# Patient Record
Sex: FEMALE | Race: OTHER | NOT HISPANIC OR LATINO | URBAN - METROPOLITAN AREA
[De-identification: names, ages, dates, MRNs, and addresses within clinical notes are randomized per-mention and may not be internally consistent; named-entity substitution may affect disease eponyms.]

---

## 2019-12-01 ENCOUNTER — EMERGENCY (EMERGENCY)
Facility: HOSPITAL | Age: 44
LOS: 0 days | Discharge: HOME | End: 2019-12-01
Attending: EMERGENCY MEDICINE | Admitting: EMERGENCY MEDICINE
Payer: MEDICAID

## 2019-12-01 VITALS
DIASTOLIC BLOOD PRESSURE: 71 MMHG | HEART RATE: 76 BPM | WEIGHT: 195.11 LBS | SYSTOLIC BLOOD PRESSURE: 109 MMHG | RESPIRATION RATE: 19 BRPM | TEMPERATURE: 98 F | OXYGEN SATURATION: 98 %

## 2019-12-01 VITALS
HEART RATE: 68 BPM | SYSTOLIC BLOOD PRESSURE: 123 MMHG | RESPIRATION RATE: 18 BRPM | DIASTOLIC BLOOD PRESSURE: 74 MMHG | OXYGEN SATURATION: 99 % | TEMPERATURE: 98 F

## 2019-12-01 DIAGNOSIS — R07.89 OTHER CHEST PAIN: ICD-10-CM

## 2019-12-01 DIAGNOSIS — R11.0 NAUSEA: ICD-10-CM

## 2019-12-01 DIAGNOSIS — R07.9 CHEST PAIN, UNSPECIFIED: ICD-10-CM

## 2019-12-01 DIAGNOSIS — F41.9 ANXIETY DISORDER, UNSPECIFIED: ICD-10-CM

## 2019-12-01 LAB
ALBUMIN SERPL ELPH-MCNC: 4.8 G/DL — SIGNIFICANT CHANGE UP (ref 3.5–5.2)
ALP SERPL-CCNC: 77 U/L — SIGNIFICANT CHANGE UP (ref 30–115)
ALT FLD-CCNC: 11 U/L — SIGNIFICANT CHANGE UP (ref 0–41)
ANION GAP SERPL CALC-SCNC: 13 MMOL/L — SIGNIFICANT CHANGE UP (ref 7–14)
AST SERPL-CCNC: 15 U/L — SIGNIFICANT CHANGE UP (ref 0–41)
BASOPHILS # BLD AUTO: 0.04 K/UL — SIGNIFICANT CHANGE UP (ref 0–0.2)
BASOPHILS NFR BLD AUTO: 0.5 % — SIGNIFICANT CHANGE UP (ref 0–1)
BILIRUB SERPL-MCNC: 0.2 MG/DL — SIGNIFICANT CHANGE UP (ref 0.2–1.2)
BUN SERPL-MCNC: 11 MG/DL — SIGNIFICANT CHANGE UP (ref 10–20)
CALCIUM SERPL-MCNC: 9.1 MG/DL — SIGNIFICANT CHANGE UP (ref 8.5–10.1)
CHLORIDE SERPL-SCNC: 104 MMOL/L — SIGNIFICANT CHANGE UP (ref 98–110)
CO2 SERPL-SCNC: 22 MMOL/L — SIGNIFICANT CHANGE UP (ref 17–32)
CREAT SERPL-MCNC: 0.7 MG/DL — SIGNIFICANT CHANGE UP (ref 0.7–1.5)
EOSINOPHIL # BLD AUTO: 0.03 K/UL — SIGNIFICANT CHANGE UP (ref 0–0.7)
EOSINOPHIL NFR BLD AUTO: 0.4 % — SIGNIFICANT CHANGE UP (ref 0–8)
GLUCOSE SERPL-MCNC: 120 MG/DL — HIGH (ref 70–99)
HCG SERPL QL: NEGATIVE — SIGNIFICANT CHANGE UP
HCT VFR BLD CALC: 40.6 % — SIGNIFICANT CHANGE UP (ref 37–47)
HGB BLD-MCNC: 13.7 G/DL — SIGNIFICANT CHANGE UP (ref 12–16)
IMM GRANULOCYTES NFR BLD AUTO: 0.3 % — SIGNIFICANT CHANGE UP (ref 0.1–0.3)
LYMPHOCYTES # BLD AUTO: 1.99 K/UL — SIGNIFICANT CHANGE UP (ref 1.2–3.4)
LYMPHOCYTES # BLD AUTO: 26.3 % — SIGNIFICANT CHANGE UP (ref 20.5–51.1)
MCHC RBC-ENTMCNC: 29.5 PG — SIGNIFICANT CHANGE UP (ref 27–31)
MCHC RBC-ENTMCNC: 33.7 G/DL — SIGNIFICANT CHANGE UP (ref 32–37)
MCV RBC AUTO: 87.3 FL — SIGNIFICANT CHANGE UP (ref 81–99)
MONOCYTES # BLD AUTO: 0.32 K/UL — SIGNIFICANT CHANGE UP (ref 0.1–0.6)
MONOCYTES NFR BLD AUTO: 4.2 % — SIGNIFICANT CHANGE UP (ref 1.7–9.3)
NEUTROPHILS # BLD AUTO: 5.17 K/UL — SIGNIFICANT CHANGE UP (ref 1.4–6.5)
NEUTROPHILS NFR BLD AUTO: 68.3 % — SIGNIFICANT CHANGE UP (ref 42.2–75.2)
NRBC # BLD: 0 /100 WBCS — SIGNIFICANT CHANGE UP (ref 0–0)
PLATELET # BLD AUTO: 285 K/UL — SIGNIFICANT CHANGE UP (ref 130–400)
POTASSIUM SERPL-MCNC: 4.1 MMOL/L — SIGNIFICANT CHANGE UP (ref 3.5–5)
POTASSIUM SERPL-SCNC: 4.1 MMOL/L — SIGNIFICANT CHANGE UP (ref 3.5–5)
PROT SERPL-MCNC: 7.8 G/DL — SIGNIFICANT CHANGE UP (ref 6–8)
RBC # BLD: 4.65 M/UL — SIGNIFICANT CHANGE UP (ref 4.2–5.4)
RBC # FLD: 12.6 % — SIGNIFICANT CHANGE UP (ref 11.5–14.5)
SODIUM SERPL-SCNC: 139 MMOL/L — SIGNIFICANT CHANGE UP (ref 135–146)
TROPONIN T SERPL-MCNC: <0.01 NG/ML — SIGNIFICANT CHANGE UP
TROPONIN T SERPL-MCNC: <0.01 NG/ML — SIGNIFICANT CHANGE UP
WBC # BLD: 7.57 K/UL — SIGNIFICANT CHANGE UP (ref 4.8–10.8)
WBC # FLD AUTO: 7.57 K/UL — SIGNIFICANT CHANGE UP (ref 4.8–10.8)

## 2019-12-01 PROCEDURE — 99285 EMERGENCY DEPT VISIT HI MDM: CPT

## 2019-12-01 PROCEDURE — 71046 X-RAY EXAM CHEST 2 VIEWS: CPT | Mod: 26

## 2019-12-01 RX ORDER — IBUPROFEN 200 MG
600 TABLET ORAL ONCE
Refills: 0 | Status: COMPLETED | OUTPATIENT
Start: 2019-12-01 | End: 2019-12-01

## 2019-12-01 RX ADMIN — Medication 600 MILLIGRAM(S): at 14:31

## 2019-12-01 NOTE — ED PROVIDER NOTE - PHYSICAL EXAMINATION
PHYSICAL EXAM: I have reviewed current vital signs.  GENERAL: NAD, tearful/anxious.  HEAD:  Normocephalic, atraumatic.  EYES: Conjunctiva and sclera clear.  ENT: MMM, no erythema/exudates.  NECK: Supple, full ROM.  CHEST/LUNG: Clear to auscultation bilaterally; no wheezes, rales, or rhonchi.  HEART: Regular rate and rhythm, normal S1 and S2; no murmurs, rubs, or gallops.  ABDOMEN: Soft, nontender, nondistended.  EXTREMITIES:  2+ peripheral pulses; FROM.  PSYCH: Cooperative, appropriate, normal mood and affect.  NEUROLOGY: A&O x 3. Motor 5/5. No focal neurological deficits.   SKIN: Warm and dry. No rashes.

## 2019-12-01 NOTE — ED PROVIDER NOTE - NS ED ROS FT
Constitutional:  No fevers or chills.  Eyes:  No visual changes, eye pain, or discharge.  ENT:  No sore throat.  Neck:  No neck pain.  Cardiac:  +CP, no edema.  Resp:  No cough or SOB.  GI:  +N. No vomiting, diarrhea, or abdominal pain.  :  No dysuria, frequency, or hematuria.  MSK:  No myalgias or joint pain/swelling.  Neuro:  No headache, dizziness, or weakness.  Skin:  No skin rash.

## 2019-12-01 NOTE — ED PROVIDER NOTE - PATIENT PORTAL LINK FT
You can access the FollowMyHealth Patient Portal offered by [facility] by registering at the following website: [email or web address]. By joining [employer]’s FollowMyHealth portal, you will also be able to view your health information using other applications (apps) compatible with our system.

## 2019-12-01 NOTE — ED PROVIDER NOTE - PROGRESS NOTE DETAILS
Informed patient and family of lab/radiology results. Given cardiologist. Strict return precaution signs/sxs given. Trop negative x 2. Pain improved.

## 2019-12-01 NOTE — ED PROVIDER NOTE - CARE PROVIDER_API CALL
Renetta Lim)  Cardiology; Internal Medicine; Nuclear Cardiology  [address] [place] [address]  Phone: [phone]  Fax: [phone]  Follow Up Time:

## 2019-12-01 NOTE — ED PROVIDER NOTE - NSFOLLOWUPINSTRUCTIONS_ED_ALL_ED_FT
Please follow-up with the cardiologist as soon as possible.    Chest Pain  Chest pain can be caused by many different conditions which may or may not be dangerous. Causes include heartburn, lung infections, heart attack, blood clot in lungs, skin infections, strain or damage to muscle, cartilage, or bones, etc. In addition to a history and physical examination, an electrocardiogram (ECG) or other lab tests may have been performed to determine the cause of your chest pain. Follow up with your primary care provider or with a cardiologist as instructed.     SEEK IMMEDIATE MEDICAL CARE IF YOU HAVE ANY OF THE FOLLOWING SYMPTOMS: worsening chest pain, coughing up blood, unexplained back/neck/jaw pain, severe abdominal pain, dizziness or lightheadedness, fainting, shortness of breath, sweaty or clammy skin, vomiting, or racing heart beat. These symptoms may represent a serious problem that is an emergency. Do not wait to see if the symptoms will go away. Get medical help right away. Call 911 and do not drive yourself to the hospital.

## 2019-12-01 NOTE — ED ADULT NURSE NOTE - NS_SISCREENINGSR_GEN_ALL_ED
OT IRP Treatment      Primary Rehabilitation Diagnosis: CVA  Expected Discharge Date: 03/19/19  Planned Discharge Destination: Home    SUBJECTIVE: Subjective: pt agreeable to occupational therapy treatment session.  (02/19/19 1400)  Subjective/Objective Comments: rehab aid assists pt back to room  (02/19/19 1400)    OBJECTIVE:  Precautions  Other Precautions: fall risk, LLE incoordination (02/18/19 0702)    See below for current functional status overview.  See OT flowsheet for full details regarding the OT therapy provided.    ASSESSMENT:   Treatment today focused on functional transfers, LUE neuro re-ed, and discussion on plan of care.  Progress supported by participation in therapy session. Patient limited at this time by fatigue, left-sided weakness, and balance deficits.  Patient will benefit from further skilled OT  for continued training with ADLs and functional mobility to help the patient meet goal of increasing independence.    OT Identified Barriers to Discharge: left sided weakness, balance impairments, coordination impairments, decreased safety, ADLs, functional mobility     This patient participated in all scheduled occupational therapy time with this therapist today.    EDUCATION:   On this date, education was provided to patient regarding  plan of care  The response to education was/were: Needs reinforcement    PLAN:   Continue skilled OT, including the following Treatment Interventions: ADL retraining;Functional transfer training;UE strengthening/ROM;Endurance training;Cognitive reorientation;Patient/Family training;Equipment eval/education;Neuro muscular reeducation;Fine motor coordination activities;Compensatory technique education (02/19/19 1400)   OT Frequency: 7 days/week (02/19/19 1400), Frequency Comments: 90 min at least 5 days per week  (02/19/19 1400)    Treatment Plan for Next Session: am: seated shower, dressing and grooming focus on L sided use as stabilizer  Additional Plan  Considerations: PM: LUE strengthening, setup Bioness to LUE (order in chart already)        RECOMMENDATIONS FOR DISCHARGE:  Recommendations for Discharge: OT: Home, Home therapy    PT/OT Mobility Equipment for Discharge: Will continue to assess (02/19/19 1400)  PT/OT ADL Equipment for Discharge: to be assessed  (02/19/19 1400)      FUNCTIONAL DATA OVERVIEW LAST 24 HOURS  ADLs   Self Cares/ADL's  Self Cares/ADL's Comments #1: pt completed prior to session (02/19/19 1030)    Household mobility  Household Mobility  Sit to Stand: Minimal Assist (Min) (02/19/19 1400)  Stand to Sit: Minimal Assist (Min) (02/19/19 1400)  Stand Pivot Transfers: Minimal Assist (Min) (02/19/19 1400)  Transfer Equipment: gait belt (02/19/19 1400)  Sitting - Static: Supervision (02/19/19 1400)  Sitting - Dynamic: Supervision (02/19/19 1400)  Standing - Static: Minimal Assist (Min) (02/19/19 1400)  Standing - Dynamic: Minimal Assist (Min) (02/19/19 1400)  Household Mobility Comments #1: pt performs functional transfers at min assist level. pt requires level of assist due to balance deficits, fatigue, and left-sided weakness.  (02/19/19 1400)    Home Management       Tolerance  OT Activity Tolerance  Activity Tolerance: 1:1 Activity to rest (02/19/19 1400)  Activity Tolerance Comments: fair  (02/19/19 1400)    Cognition  Communication/Cognition  Communication: Clear speech (02/19/19 1400)  Overall Cognitive Status: Within Functional Limits (02/19/19 1400)  Arousal/Alertness: Appropriate responses to stimuli (02/19/19 1400)  Attention: Appears intact (02/19/19 1400)  Following Verbal Directions: Follows one step directions without difficulty (02/19/19 1400)  Following Demonstrated Directions: Follows one step directions without difficulty (02/19/19 1400)  Additional Functional Cognition Tests: Cognitive Performance Test (02/19/19 1030)  Additional Functional Cognition Tests Interpretation: pt scored 4.5/6 on [employer] and 5/6 on the shop subtask  (02/19/19 1030)    Interventions  Other Interventions 1: focus on LUE neuro re-ed utilizing closed chained exercise, guided functional reaching, guided functional grasp and release, and AAROM  (02/19/19 1400)  Other Interventions 2: time spent discussing plan of care, pt verbalizes understanding.  (02/19/19 1400)      Negative

## 2019-12-01 NOTE — ED PROVIDER NOTE - CLINICAL SUMMARY MEDICAL DECISION MAKING FREE TEXT BOX
44y female with left chest wall/shoulder/arm pain as above, has h/o similar symptoms in past and had negative stress test 1 year ago with her cardiologist in [facility] as she has significant FHx premature CAD, on exam vital signs appreciated, nontoxic appearing though anxious and tearful, head nc/at, perrla, EOMI, conj pink op clear neck supple cor rrr lungs cta + ttp left pectoralis exactly reproducing pain, no rash, abd snt no c/c/e pulses equal calves nontender neuro intact, labs and studies reviewed and d/w patient and family, will d/c to f/u with cardio. Patient counseled regarding conditions which should prompt return.

## 2019-12-01 NOTE — ED PROVIDER NOTE - ATTENDING CONTRIBUTION TO CARE
44y female with left chest wall/shoulder/arm pain as above, has h/o similar symptoms in past and had negative stress test 1 year ago with her cardiologist in [facility] as she has significant FHx premature CAD, on exam vital signs appreciated, nontoxic appearing though anxious and tearful, head nc/at, perrla, EOMI, conj pink op clear neck supple cor rrr lungs cta + ttp left pectoralis exactly reproducing pain, no rash, abd snt no c/c/e pulses equal calves nontender neuro intact, labs and studies reviewed and d/w patient and family, will d/c to f/u with cardio. Patient counseled regarding conditions which should prompt return.

## 2019-12-01 NOTE — ED PROVIDER NOTE - OBJECTIVE STATEMENT
43yo F with PMH of herniated disks, asthma, and anxiety/panic attacks presenting to ED with left sided CP with radiation down left arm that started about 30 minutes PTA after patient became stressed about a family matter. Describes the pain as intermittent, sharp, mild to moderate, waxing and waning, worse with deep breaths, and with associated nausea. Denies any injuries/traumas/falls, SOB, back pain, abd pain, vomiting/diarrhea, rash, or paresthesias. Patient states she took a baby ASA. Last LMP was about 1 week ago. Former smoker. +Significant family hx of heart disease in mother and brother passed away at 44 from MI.

## 2020-03-15 ENCOUNTER — EMERGENCY (EMERGENCY)
Facility: HOSPITAL | Age: 45
LOS: 0 days | Discharge: HOME | End: 2020-03-15
Attending: EMERGENCY MEDICINE | Admitting: EMERGENCY MEDICINE
Payer: MEDICAID

## 2020-03-15 VITALS
HEIGHT: 62 IN | HEART RATE: 86 BPM | WEIGHT: 190.04 LBS | TEMPERATURE: 99 F | RESPIRATION RATE: 18 BRPM | SYSTOLIC BLOOD PRESSURE: 102 MMHG | OXYGEN SATURATION: 100 % | DIASTOLIC BLOOD PRESSURE: 83 MMHG

## 2020-03-15 DIAGNOSIS — R05 COUGH: ICD-10-CM

## 2020-03-15 DIAGNOSIS — J45.909 UNSPECIFIED ASTHMA, UNCOMPLICATED: ICD-10-CM

## 2020-03-15 PROBLEM — F41.9 ANXIETY DISORDER, UNSPECIFIED: Chronic | Status: ACTIVE | Noted: 2019-12-01

## 2020-03-15 PROCEDURE — 99283 EMERGENCY DEPT VISIT LOW MDM: CPT

## 2020-03-15 RX ORDER — DEXAMETHASONE 0.5 MG/5ML
10 ELIXIR ORAL ONCE
Refills: 0 | Status: COMPLETED | OUTPATIENT
Start: 2020-03-15 | End: 2020-03-15

## 2020-03-15 RX ADMIN — Medication 10 MILLIGRAM(S): at 15:22

## 2020-03-15 NOTE — ED PROVIDER NOTE - PHYSICAL EXAMINATION
CONST: NAD  EYES: Sclera and conjunctiva clear.   ENT: Clear nasal discharge. Oropharynx normal appearing, no erythema or exudates. No abscess or swelling. Uvula midline.   NECK: Non-tender, no meningeal signs. normal ROM. supple   CARD: S1 S2; No jvd  RESP: Equal BS B/L, No wheezes, rhonchi or rales. No distress  GI: Soft, non-tender, non-distended. no cva tenderness. normal BS  MS: Normal ROM in all extremities. pulses 2 +. no calf tenderness or swelling  SKIN: Warm, dry, no acute rashes. Good turgor  NEURO: A&Ox4, No focal deficits. Strength 5/5 with no sensory deficits. Steady gait.

## 2020-03-15 NOTE — ED PROVIDER NOTE - PATIENT PORTAL LINK FT
You can access the FollowMyHealth Patient Portal offered by [facility] by registering at the following website: [email or web address]. By joining [employer]’s FollowMyHealth portal, you will also be able to view your health information using other applications (apps) compatible with our system.

## 2020-03-15 NOTE — ED PROVIDER NOTE - NS ED ROS FT
Constitutional: (-) fever  Eyes/ENT: (-) blurry vision, (-) epistaxis  Cardiovascular: (-) chest pain, (-) syncope  Respiratory: (+) cough, (-) shortness of breath  Gastrointestinal: (-) vomiting, (-) diarrhea  : (-) dysuria, (-) hematuria  Musculoskeletal: (-) neck pain, (-) back pain, (-) joint pain  Integumentary: (-) rash, (-) edema  Neurological: (-) headache, (-) altered mental status  Allergic/Immunologic: (-) pruritus

## 2020-03-15 NOTE — ED PROVIDER NOTE - ATTENDING CONTRIBUTION TO CARE
45 year old female, no sig pmhx, here with uri symptoms, dry non productive cough, no fever, no loc, no n/v/d, no criteria for covid testing met, no sick contacts, no recent travel    CONSTITUTIONAL: Well-developed; well-nourished; in no acute distress. Sitting up and providing appropriate history and physical examination  SKIN: skin exam is warm and dry, no acute rash.  HEAD: Normocephalic; atraumatic.  EYES: PERRL, 3 mm bilateral, no nystagmus, EOM intact; conjunctiva and sclera clear.  ENT: + Pharyngeal erythema, no exudate, no edema, No nasal discharge; airway clear.  NECK: Supple; non tender. + full passive ROM in all directions. No JVD  CARD: S1, S2 normal; no murmurs, gallops, or rubs. Regular rate and rhythm. + Symmetric Strong Pulses  RESP: No wheezes, rales or rhonchi. Good air movement bilaterally  ABD: soft; non-distended; non-tender. No Rebound, No Guarding, No signs of peritonitis, No CVA tenderness. No pulsatile abdominal mass. + Strong and Symmetric Pulses  EXT: Normal ROM. No clubbing, cyanosis or edema. Dp and Pt Pulses intact. Cap refill less than 3 seconds  NEURO: CN 2-12 intact, normal finger to nose, normal romberg, stable gait, no sensory or motor deficits, Alert, oriented, grossly unremarkable. No Focal deficits. GCS 15. NIH 0  PSYCH: Cooperative, appropriate.

## 2020-03-15 NOTE — ED PROVIDER NOTE - OBJECTIVE STATEMENT
45y F pmh as listed presents for eval of cough. Pt has np cough x2 days, no aggravating or relieving factors. Denies fever, ha, cp, sob, rhinorrhea, weakness, numbness, n/v/d/c

## 2021-08-13 ENCOUNTER — INPATIENT (INPATIENT)
Facility: HOSPITAL | Age: 46
LOS: 2 days | Discharge: HOME HEALTH | End: 2021-08-16
Attending: INTERNAL MEDICINE | Admitting: INTERNAL MEDICINE
Payer: MEDICAID

## 2021-08-13 VITALS — WEIGHT: 190.04 LBS | HEIGHT: 62 IN

## 2021-08-13 DIAGNOSIS — Z98.890 OTHER SPECIFIED POSTPROCEDURAL STATES: Chronic | ICD-10-CM

## 2021-08-13 DIAGNOSIS — I63.9 CEREBRAL INFARCTION, UNSPECIFIED: ICD-10-CM

## 2021-08-13 LAB
ALBUMIN SERPL ELPH-MCNC: 4.5 G/DL — SIGNIFICANT CHANGE UP (ref 3.5–5.2)
ALP SERPL-CCNC: 95 U/L — SIGNIFICANT CHANGE UP (ref 30–115)
ALT FLD-CCNC: 14 U/L — SIGNIFICANT CHANGE UP (ref 0–41)
ANION GAP SERPL CALC-SCNC: 16 MMOL/L — HIGH (ref 7–14)
APTT BLD: 33.7 SEC — SIGNIFICANT CHANGE UP (ref 27–39.2)
AST SERPL-CCNC: 18 U/L — SIGNIFICANT CHANGE UP (ref 0–41)
BASOPHILS # BLD AUTO: 0.04 K/UL — SIGNIFICANT CHANGE UP (ref 0–0.2)
BASOPHILS NFR BLD AUTO: 0.4 % — SIGNIFICANT CHANGE UP (ref 0–1)
BILIRUB SERPL-MCNC: 0.3 MG/DL — SIGNIFICANT CHANGE UP (ref 0.2–1.2)
BUN SERPL-MCNC: 12 MG/DL — SIGNIFICANT CHANGE UP (ref 10–20)
CALCIUM SERPL-MCNC: 9.3 MG/DL — SIGNIFICANT CHANGE UP (ref 8.5–10.1)
CHLORIDE SERPL-SCNC: 101 MMOL/L — SIGNIFICANT CHANGE UP (ref 98–110)
CO2 SERPL-SCNC: 19 MMOL/L — SIGNIFICANT CHANGE UP (ref 17–32)
CREAT SERPL-MCNC: 0.7 MG/DL — SIGNIFICANT CHANGE UP (ref 0.7–1.5)
EOSINOPHIL # BLD AUTO: 0.03 K/UL — SIGNIFICANT CHANGE UP (ref 0–0.7)
EOSINOPHIL NFR BLD AUTO: 0.3 % — SIGNIFICANT CHANGE UP (ref 0–8)
GLUCOSE SERPL-MCNC: 105 MG/DL — HIGH (ref 70–99)
HCT VFR BLD CALC: 38.2 % — SIGNIFICANT CHANGE UP (ref 37–47)
HGB BLD-MCNC: 13.1 G/DL — SIGNIFICANT CHANGE UP (ref 12–16)
IMM GRANULOCYTES NFR BLD AUTO: 0.4 % — HIGH (ref 0.1–0.3)
INR BLD: 1.15 RATIO — SIGNIFICANT CHANGE UP (ref 0.65–1.3)
LYMPHOCYTES # BLD AUTO: 2.72 K/UL — SIGNIFICANT CHANGE UP (ref 1.2–3.4)
LYMPHOCYTES # BLD AUTO: 30.4 % — SIGNIFICANT CHANGE UP (ref 20.5–51.1)
MCHC RBC-ENTMCNC: 29.5 PG — SIGNIFICANT CHANGE UP (ref 27–31)
MCHC RBC-ENTMCNC: 34.3 G/DL — SIGNIFICANT CHANGE UP (ref 32–37)
MCV RBC AUTO: 86 FL — SIGNIFICANT CHANGE UP (ref 81–99)
MONOCYTES # BLD AUTO: 0.54 K/UL — SIGNIFICANT CHANGE UP (ref 0.1–0.6)
MONOCYTES NFR BLD AUTO: 6 % — SIGNIFICANT CHANGE UP (ref 1.7–9.3)
NEUTROPHILS # BLD AUTO: 5.57 K/UL — SIGNIFICANT CHANGE UP (ref 1.4–6.5)
NEUTROPHILS NFR BLD AUTO: 62.5 % — SIGNIFICANT CHANGE UP (ref 42.2–75.2)
NRBC # BLD: 0 /100 WBCS — SIGNIFICANT CHANGE UP (ref 0–0)
PLATELET # BLD AUTO: 288 K/UL — SIGNIFICANT CHANGE UP (ref 130–400)
POTASSIUM SERPL-MCNC: 3.8 MMOL/L — SIGNIFICANT CHANGE UP (ref 3.5–5)
POTASSIUM SERPL-SCNC: 3.8 MMOL/L — SIGNIFICANT CHANGE UP (ref 3.5–5)
PROT SERPL-MCNC: 7.6 G/DL — SIGNIFICANT CHANGE UP (ref 6–8)
PROTHROM AB SERPL-ACNC: 13.2 SEC — HIGH (ref 9.95–12.87)
RBC # BLD: 4.44 M/UL — SIGNIFICANT CHANGE UP (ref 4.2–5.4)
RBC # FLD: 12.7 % — SIGNIFICANT CHANGE UP (ref 11.5–14.5)
SARS-COV-2 RNA SPEC QL NAA+PROBE: SIGNIFICANT CHANGE UP
SODIUM SERPL-SCNC: 136 MMOL/L — SIGNIFICANT CHANGE UP (ref 135–146)
TROPONIN T SERPL-MCNC: <0.01 NG/ML — SIGNIFICANT CHANGE UP
WBC # BLD: 8.94 K/UL — SIGNIFICANT CHANGE UP (ref 4.8–10.8)
WBC # FLD AUTO: 8.94 K/UL — SIGNIFICANT CHANGE UP (ref 4.8–10.8)

## 2021-08-13 PROCEDURE — ZZZZZ: CPT

## 2021-08-13 PROCEDURE — 0042T: CPT

## 2021-08-13 PROCEDURE — 99291 CRITICAL CARE FIRST HOUR: CPT

## 2021-08-13 PROCEDURE — 93970 EXTREMITY STUDY: CPT | Mod: 26

## 2021-08-13 PROCEDURE — 93010 ELECTROCARDIOGRAM REPORT: CPT

## 2021-08-13 PROCEDURE — 70496 CT ANGIOGRAPHY HEAD: CPT | Mod: 26,MA

## 2021-08-13 PROCEDURE — 70498 CT ANGIOGRAPHY NECK: CPT | Mod: 26,MA

## 2021-08-13 PROCEDURE — 93880 EXTRACRANIAL BILAT STUDY: CPT | Mod: 26

## 2021-08-13 PROCEDURE — 99222 1ST HOSP IP/OBS MODERATE 55: CPT

## 2021-08-13 PROCEDURE — 70450 CT HEAD/BRAIN W/O DYE: CPT | Mod: 26,MA,59

## 2021-08-13 RX ORDER — PANTOPRAZOLE SODIUM 20 MG/1
40 TABLET, DELAYED RELEASE ORAL DAILY
Refills: 0 | Status: DISCONTINUED | OUTPATIENT
Start: 2021-08-13 | End: 2021-08-16

## 2021-08-13 RX ORDER — CHLORHEXIDINE GLUCONATE 213 G/1000ML
1 SOLUTION TOPICAL EVERY 12 HOURS
Refills: 0 | Status: DISCONTINUED | OUTPATIENT
Start: 2021-08-13 | End: 2021-08-16

## 2021-08-13 RX ORDER — SODIUM CHLORIDE 9 MG/ML
1000 INJECTION INTRAMUSCULAR; INTRAVENOUS; SUBCUTANEOUS
Refills: 0 | Status: DISCONTINUED | OUTPATIENT
Start: 2021-08-13 | End: 2021-08-16

## 2021-08-13 RX ORDER — ATORVASTATIN CALCIUM 80 MG/1
80 TABLET, FILM COATED ORAL AT BEDTIME
Refills: 0 | Status: DISCONTINUED | OUTPATIENT
Start: 2021-08-13 | End: 2021-08-16

## 2021-08-13 RX ORDER — ALTEPLASE 100 MG
69.7 KIT INTRAVENOUS ONCE
Refills: 0 | Status: COMPLETED | OUTPATIENT
Start: 2021-08-13 | End: 2021-08-13

## 2021-08-13 RX ORDER — ALTEPLASE 100 MG
7.7 KIT INTRAVENOUS ONCE
Refills: 0 | Status: COMPLETED | OUTPATIENT
Start: 2021-08-13 | End: 2021-08-13

## 2021-08-13 RX ADMIN — Medication 1 MILLIGRAM(S): at 18:54

## 2021-08-13 RX ADMIN — ALTEPLASE 462 MILLIGRAM(S): KIT at 17:50

## 2021-08-13 RX ADMIN — ALTEPLASE 69.7 MILLIGRAM(S): KIT at 18:00

## 2021-08-13 RX ADMIN — ALTEPLASE 69.7 MILLIGRAM(S): KIT at 19:00

## 2021-08-13 RX ADMIN — SODIUM CHLORIDE 75 MILLILITER(S): 9 INJECTION INTRAMUSCULAR; INTRAVENOUS; SUBCUTANEOUS at 23:12

## 2021-08-13 NOTE — ED PROVIDER NOTE - OBJECTIVE STATEMENT
Patient is a 46 years old F presents to the ED for evaluation as a stroke code.  Patient found to have trouble with speech, b/l tingling sesation/numbness and weakness to left sided extremity prior to arrival to the ED.

## 2021-08-13 NOTE — ED PROVIDER NOTE - CLINICAL SUMMARY MEDICAL DECISION MAKING FREE TEXT BOX
I have fully discussed the medical management and delivery of care with the patient. I have discussed any available labs, imaging and treatment options with the patient.  Pt admitted for further care & management.     Attending Statement: I have personally provided the amount of critical care time documented below excluding time spent on separate procedures.     Critical Care Time Spent (min) Must be 30 or more minutes to qualify: 35.

## 2021-08-13 NOTE — ED ADULT TRIAGE NOTE - CHIEF COMPLAINT QUOTE
BIBA from home as per EMS pt c/o slurred speech, left sided facial droop, and left sided weakness that had started 1 hr pt

## 2021-08-13 NOTE — ED PROVIDER NOTE - PHYSICAL EXAMINATION
Gen: Alert, NAD, well appearing  Head: NC, AT, PERRL, EOMI  Pulm: Bilateral BS, normal resp effort  CV: RRR  Abd: soft, NT/ND  Neuro: AAOx3, +facial droop, +decrease sensation left sided as compared to right

## 2021-08-13 NOTE — H&P ADULT - HISTORY OF PRESENT ILLNESS
Patient is a 46y old  Female who presents with a chief complaint of acute eft sided weakness w/ right facial droop starting this afternoon @ 4 pm .  Pt was brought to ER , stroke code. Neuro was called and TPA was offered and was accepted by pt. Pt vapes but denies illict drug use

## 2021-08-13 NOTE — H&P ADULT - NSHPLABSRESULTS_GEN_ALL_CORE
13.1   8.94  )-----------( 288      ( 13 Aug 2021 17:50 )             38.2       08-13    136  |  101  |  12  ----------------------------<  105<H>  3.8   |  19  |  0.7    Ca    9.3      13 Aug 2021 17:50    TPro  7.6  /  Alb  4.5  /  TBili  0.3  /  DBili  x   /  AST  18  /  ALT  14  /  AlkPhos  95  08-13                  PT/INR - ( 13 Aug 2021 17:50 )   PT: 13.20 sec;   INR: 1.15 ratio         PTT - ( 13 Aug 2021 17:50 )  PTT:33.7 sec    Lactate Trend      CARDIAC MARKERS ( 13 Aug 2021 17:50 )  x     / <0.01 ng/mL / x     / x     / x            CAPILLARY BLOOD GLUCOSE      POCT Blood Glucose.: 111 mg/dL (13 Aug 2021 17:30)

## 2021-08-13 NOTE — ED PROVIDER NOTE - ATTENDING CONTRIBUTION TO CARE
acute onset facial droop and slurred speech 1 hour pta.  We received EMS notification at 1720 and stroke code called.  pt seen on arrival.  additional hx obtained from daughter.  I accompanied pt to CT and viewed images in real time.  No bleed verified after discussion with radiologist.  TPA tx d/w pt and daughter.  TPA given at 1750.  first neuro check:  systolic.  neuro deficit persists but "better" per pt.    VITAL SIGNS: I have reviewed nursing notes and confirm.  CONSTITUTIONAL: Well-developed; well-nourished; in no acute distress.  SKIN: Skin exam is warm and dry, no acute rash.  HEAD: Normocephalic; atraumatic.  EYES: PERRL, EOM intact; conjunctiva and sclera clear.  NECK: Supple; non tender.  No lymphadenopathy.  CARD: S1, S2 normal; no murmurs, gallops, or rubs. Regular rate and rhythm.  RESP: No wheezes, rales or rhonchi.  ABD: Normal bowel sounds; soft; non-distended; non-tender; no hepatosplenomegaly.  EXT: Normal ROM. No clubbing, cyanosis or edema.  NEURO: Alert, oriented.  left CN VII weakness that does not spare forehead.  slurred speech.  Mild LLE weakness with mild sensory deficit.  PSYCH: Cooperative, appropriate. acute onset facial droop and slurred speech 1 hour pta.  We received EMS notification at 1720 and stroke code called.  pt seen on arrival.  additional hx obtained from daughter.  I accompanied pt to CT and viewed images in real time.  No bleed verified after discussion with radiologist.  TPA tx d/w pt and daughter.  TPA given at 1750.  first neuro check:  systolic.  neuro deficit persists but "better" per pt.    VITAL SIGNS: I have reviewed nursing notes and confirm.  CONSTITUTIONAL: Well-developed; well-nourished; in no acute distress.  SKIN: Skin exam is warm and dry, no acute rash.  HEAD: Normocephalic; atraumatic.  EYES: PERRL, EOM intact; conjunctiva and sclera clear.  NECK: Supple; non tender.  No lymphadenopathy.  CARD: S1, S2 normal; no murmurs, gallops, or rubs. Regular rate and rhythm.  RESP: No wheezes, rales or rhonchi.  ABD: Normal bowel sounds; soft; non-distended; non-tender; no hepatosplenomegaly.  EXT: Normal ROM. No clubbing, cyanosis or edema.  NEURO: Alert, oriented.  left CN VII weakness that does not spare forehead.  slurred speech.    Risk/benefit of TPA d/w pt.  Mild LLE weakness with mild sensory deficit.  PSYCH: Cooperative, appropriate.

## 2021-08-13 NOTE — H&P ADULT - ASSESSMENT
1.  acute CVA w/ left sided weakness, right facial droop,  s/p TPA  2. PMH-asthma, vape tobacco, anxiety disorder      discussed w/ ICU Yohannes BRAR  adm to ICU monitoring   neuro cks q 1 hr  BP monitor  no anti coag for 24 hrs  f/u MRI/CT head after 24 hrs  neuro follow up

## 2021-08-13 NOTE — ED PROVIDER NOTE - NS ED ROS FT
Review of Systems    Constitutional: (-) fever  Eyes/ENT: (-) blurry vision, (-) epistaxis  Cardiovascular: (-) chest pain, (-) syncope  Respiratory: (-) cough, (-) shortness of breath  Gastrointestinal: (-) vomiting, (-) diarrhea  Musculoskeletal: (-) neck pain  Integumentary: (-) rash  Neurological: (-) headache

## 2021-08-13 NOTE — H&P ADULT - NSHPPHYSICALEXAM_GEN_ALL_CORE
GENERAL:  47y/o [place] Female NAD, resting comfortably.  HEAD:  Atraumatic, Normocephalic right facial droop  EYES: EOMI, PERRLA, conjunctiva and sclera clear  NECK: Supple, No JVD, no cervical lymphadenopathy, non-tender  CHEST/LUNG: Clear to auscultation bilaterally; No wheeze, rhonchi, or rales  HEART: Regular rate and rhythm; S1&S2  ABDOMEN: Soft, Nontender, Nondistended x 4 quadrants; Bowel sounds present  EXTREMITIES:   Peripheral Pulses Present, No clubbing, no cyanosis, or no edema, no calf tenderness  PSYCH: AAOx3, cooperative, appropriate  NEUROLOGY: left sided weakness, rt facial  droop   SKIN: WNL

## 2021-08-13 NOTE — ED ADULT NURSE NOTE - NSSUHOSCREENINGYN_ED_ALL_ED
Patient is in the supine position.   The body was positioned using the following devices: safety strap, gel pad mattress, self-adhering foam and blanket.  The patient was positioned by MELISSA REECE KALTENBRUN, JAMES, HEALY, KIM, ZORICH, DANA  Yes - the patient is able to be screened

## 2021-08-14 LAB
COVID-19 SPIKE DOMAIN AB INTERP: POSITIVE
COVID-19 SPIKE DOMAIN ANTIBODY RESULT: >250 U/ML — HIGH
HCG UR QL: NEGATIVE — SIGNIFICANT CHANGE UP
SARS-COV-2 IGG+IGM SERPL QL IA: >250 U/ML — HIGH
SARS-COV-2 IGG+IGM SERPL QL IA: POSITIVE

## 2021-08-14 PROCEDURE — 93010 ELECTROCARDIOGRAM REPORT: CPT

## 2021-08-14 PROCEDURE — 99223 1ST HOSP IP/OBS HIGH 75: CPT

## 2021-08-14 PROCEDURE — 99233 SBSQ HOSP IP/OBS HIGH 50: CPT

## 2021-08-14 PROCEDURE — 99222 1ST HOSP IP/OBS MODERATE 55: CPT

## 2021-08-14 PROCEDURE — 71045 X-RAY EXAM CHEST 1 VIEW: CPT | Mod: 26

## 2021-08-14 RX ORDER — ACETAMINOPHEN 500 MG
650 TABLET ORAL EVERY 6 HOURS
Refills: 0 | Status: DISCONTINUED | OUTPATIENT
Start: 2021-08-14 | End: 2021-08-16

## 2021-08-14 RX ORDER — SODIUM CHLORIDE 9 MG/ML
500 INJECTION INTRAMUSCULAR; INTRAVENOUS; SUBCUTANEOUS ONCE
Refills: 0 | Status: COMPLETED | OUTPATIENT
Start: 2021-08-14 | End: 2021-08-14

## 2021-08-14 RX ORDER — ONDANSETRON 8 MG/1
4 TABLET, FILM COATED ORAL ONCE
Refills: 0 | Status: COMPLETED | OUTPATIENT
Start: 2021-08-14 | End: 2021-08-14

## 2021-08-14 RX ORDER — SODIUM CHLORIDE 9 MG/ML
1000 INJECTION INTRAMUSCULAR; INTRAVENOUS; SUBCUTANEOUS ONCE
Refills: 0 | Status: COMPLETED | OUTPATIENT
Start: 2021-08-14 | End: 2021-08-14

## 2021-08-14 RX ORDER — NOREPINEPHRINE BITARTRATE/D5W 8 MG/250ML
0.05 PLASTIC BAG, INJECTION (ML) INTRAVENOUS
Qty: 8 | Refills: 0 | Status: DISCONTINUED | OUTPATIENT
Start: 2021-08-14 | End: 2021-08-16

## 2021-08-14 RX ORDER — SODIUM CHLORIDE 9 MG/ML
500 INJECTION, SOLUTION INTRAVENOUS ONCE
Refills: 0 | Status: COMPLETED | OUTPATIENT
Start: 2021-08-14 | End: 2021-08-14

## 2021-08-14 RX ADMIN — CHLORHEXIDINE GLUCONATE 1 APPLICATION(S): 213 SOLUTION TOPICAL at 21:04

## 2021-08-14 RX ADMIN — Medication 650 MILLIGRAM(S): at 08:08

## 2021-08-14 RX ADMIN — PANTOPRAZOLE SODIUM 40 MILLIGRAM(S): 20 TABLET, DELAYED RELEASE ORAL at 13:00

## 2021-08-14 RX ADMIN — SODIUM CHLORIDE 500 MILLILITER(S): 9 INJECTION INTRAMUSCULAR; INTRAVENOUS; SUBCUTANEOUS at 03:35

## 2021-08-14 RX ADMIN — ONDANSETRON 4 MILLIGRAM(S): 8 TABLET, FILM COATED ORAL at 03:23

## 2021-08-14 RX ADMIN — Medication 650 MILLIGRAM(S): at 01:00

## 2021-08-14 RX ADMIN — SODIUM CHLORIDE 1000 MILLILITER(S): 9 INJECTION, SOLUTION INTRAVENOUS at 20:30

## 2021-08-14 RX ADMIN — Medication 30 MILLILITER(S): at 15:30

## 2021-08-14 RX ADMIN — SODIUM CHLORIDE 1000 MILLILITER(S): 9 INJECTION, SOLUTION INTRAVENOUS at 10:36

## 2021-08-14 RX ADMIN — ATORVASTATIN CALCIUM 80 MILLIGRAM(S): 80 TABLET, FILM COATED ORAL at 01:26

## 2021-08-14 RX ADMIN — Medication 650 MILLIGRAM(S): at 09:38

## 2021-08-14 RX ADMIN — SODIUM CHLORIDE 1000 MILLILITER(S): 9 INJECTION INTRAMUSCULAR; INTRAVENOUS; SUBCUTANEOUS at 03:47

## 2021-08-14 RX ADMIN — Medication 650 MILLIGRAM(S): at 00:22

## 2021-08-14 RX ADMIN — ATORVASTATIN CALCIUM 80 MILLIGRAM(S): 80 TABLET, FILM COATED ORAL at 21:04

## 2021-08-14 NOTE — PROGRESS NOTE ADULT - ASSESSMENT
46y year old Female presenting with left sided weakness and numbness s/p TPA in the ED.     CT head: No acute intracranial pathology.  Normal CT angiogram of the head and neck.  Normal perfusion images of the brain.    A&P    # acute CVA w/ left sided weakness, right facial droop,  s/p TPA  Repeat CTH- pending- If repeat CTH negative for hemorrhage then ok to give aspirin after 24 hours, can start statin 80mg qd  - ECHO w/ Bubble  - Hypercoagulable labs, LDL, hgba1c  - MRI brain w/o DAVONTE  Neurology following    # hypotension- on levophed; target -140; wean as tolerated    DVT px- SCD    #Progress Note Handoff  Pending (specify): CT head repeat; MRi, echo, PT  Family discussion:plan of care d/w patient and hiusband at bedside  Disposition: Home vs rehab

## 2021-08-14 NOTE — PHYSICAL THERAPY INITIAL EVALUATION ADULT - ADDITIONAL COMMENTS
Pt lives with family in house with steps outside and inside. Pt has been using st cane for amb due to back trouble about a year. Pt has been independent with amb PTA

## 2021-08-14 NOTE — PHYSICAL THERAPY INITIAL EVALUATION ADULT - GENERAL OBSERVATIONS, REHAB EVAL
11:00-11:25 Chart reviewed. Patient available to be seen for physical therapy, denies pain, confirmed with RN.  Pt rec'd in bed, +Telemetry and all ICU lines intact, pt in NAD.

## 2021-08-14 NOTE — OCCUPATIONAL THERAPY INITIAL EVALUATION ADULT - LEVEL OF INDEPENDENCE: BED TO CHAIR, REHAB EVAL
As per Dr. Gr hold mobility at this time 2/2 pt report of pressure in chest; to be assessed when appropriate

## 2021-08-14 NOTE — PROGRESS NOTE ADULT - SUBJECTIVE AND OBJECTIVE BOX
HPI  Patient is a 46y old Female who presents with a chief complaint of acute CVA (14 Aug 2021 09:33)    Currently admitted to medicine with the primary diagnosis of Stroke       Today is hospital day 1d.     INTERVAL HPI / OVERNIGHT EVENTS:  Patient was examined and seen at bedside.   feels better than before  no chest pain or sob  always has low BP   present at bedside        PAST MEDICAL & SURGICAL HISTORY  Asthma    Anxiety and depression    Vapes nicotine containing substance    S/P hernia repair    H/O cervical spine surgery      ALLERGIES  No Known Allergies    MEDICATIONS  STANDING MEDICATIONS  atorvastatin 80 milliGRAM(s) Oral at bedtime  chlorhexidine 4% Liquid 1 Application(s) Topical every 12 hours  norepinephrine Infusion 0.05 MICROgram(s)/kG/Min IV Continuous <Continuous>  pantoprazole  Injectable 40 milliGRAM(s) IV Push daily  sodium chloride 0.9%. 1000 milliLiter(s) IV Continuous <Continuous>    PRN MEDICATIONS  acetaminophen    Suspension .. 650 milliGRAM(s) Oral every 6 hours PRN    VITALS:  T(F): 97.2  HR: 65  BP: 122/64  RR: 16  SpO2: 100%    PHYSICAL EXAM  GEN: NAD, Resting comfortably in bed  PULM: Clear to auscultation bilaterally, No wheezing  CVS: Regular rate and rhythm, S1-S2, no murmurs  EXT: No edema  NEURO: A&Ox3, right facial deviation; decrease strength on left side    LABS                        13.1   8.94  )-----------( 288      ( 13 Aug 2021 17:50 )             38.2     08-13    136  |  101  |  12  ----------------------------<  105<H>  3.8   |  19  |  0.7    Ca    9.3      13 Aug 2021 17:50    TPro  7.6  /  Alb  4.5  /  TBili  0.3  /  DBili  x   /  AST  18  /  ALT  14  /  AlkPhos  95  08-13    PT/INR - ( 13 Aug 2021 17:50 )   PT: 13.20 sec;   INR: 1.15 ratio         PTT - ( 13 Aug 2021 17:50 )  PTT:33.7 sec      Troponin T, Serum: <0.01 ng/mL (08-13-21 @ 17:50)      CARDIAC MARKERS ( 13 Aug 2021 17:50 )  x     / <0.01 ng/mL / x     / x     / x          RADIOLOGY

## 2021-08-14 NOTE — OCCUPATIONAL THERAPY INITIAL EVALUATION ADULT - GENERAL OBSERVATIONS, REHAB EVAL
08:40-09:08 chart reviewed, ok to treat by Occupational Therapist as confirmed by RN Ana, Pt received semi-salazar in bed +IV +Tele in NAD. Daughter present. Pt in agreement with OT IE

## 2021-08-14 NOTE — OCCUPATIONAL THERAPY INITIAL EVALUATION ADULT - LIVES WITH, PROFILE
in a private house +7 steps to enter with handrail on the right side; +12 steps to bedroom/bathroom handrail for 6 steps on the left side; +tub/children/spouse

## 2021-08-15 LAB
ALBUMIN SERPL ELPH-MCNC: 3.6 G/DL — SIGNIFICANT CHANGE UP (ref 3.5–5.2)
ALP SERPL-CCNC: 78 U/L — SIGNIFICANT CHANGE UP (ref 30–115)
ALT FLD-CCNC: 24 U/L — SIGNIFICANT CHANGE UP (ref 0–41)
ANION GAP SERPL CALC-SCNC: 10 MMOL/L — SIGNIFICANT CHANGE UP (ref 7–14)
AST SERPL-CCNC: 25 U/L — SIGNIFICANT CHANGE UP (ref 0–41)
BASOPHILS # BLD AUTO: 0.06 K/UL — SIGNIFICANT CHANGE UP (ref 0–0.2)
BASOPHILS NFR BLD AUTO: 0.5 % — SIGNIFICANT CHANGE UP (ref 0–1)
BILIRUB SERPL-MCNC: <0.2 MG/DL — SIGNIFICANT CHANGE UP (ref 0.2–1.2)
BUN SERPL-MCNC: 13 MG/DL — SIGNIFICANT CHANGE UP (ref 10–20)
CALCIUM SERPL-MCNC: 8.3 MG/DL — LOW (ref 8.5–10.1)
CHLORIDE SERPL-SCNC: 106 MMOL/L — SIGNIFICANT CHANGE UP (ref 98–110)
CHOLEST SERPL-MCNC: 127 MG/DL — SIGNIFICANT CHANGE UP
CO2 SERPL-SCNC: 20 MMOL/L — SIGNIFICANT CHANGE UP (ref 17–32)
CORTIS AM PEAK SERPL-MCNC: 4.5 UG/DL — LOW (ref 6–18.4)
CREAT SERPL-MCNC: 0.5 MG/DL — LOW (ref 0.7–1.5)
EOSINOPHIL # BLD AUTO: 0.1 K/UL — SIGNIFICANT CHANGE UP (ref 0–0.7)
EOSINOPHIL NFR BLD AUTO: 0.8 % — SIGNIFICANT CHANGE UP (ref 0–8)
GLUCOSE SERPL-MCNC: 186 MG/DL — HIGH (ref 70–99)
HCT VFR BLD CALC: 34.2 % — LOW (ref 37–47)
HCYS SERPL-MCNC: 8.6 UMOL/L — SIGNIFICANT CHANGE UP
HDLC SERPL-MCNC: 41 MG/DL — LOW
HGB BLD-MCNC: 11.8 G/DL — LOW (ref 12–16)
IMM GRANULOCYTES NFR BLD AUTO: 0.5 % — HIGH (ref 0.1–0.3)
LIPID PNL WITH DIRECT LDL SERPL: 77 MG/DL — SIGNIFICANT CHANGE UP
LYMPHOCYTES # BLD AUTO: 25.7 % — SIGNIFICANT CHANGE UP (ref 20.5–51.1)
LYMPHOCYTES # BLD AUTO: 3.2 K/UL — SIGNIFICANT CHANGE UP (ref 1.2–3.4)
MAGNESIUM SERPL-MCNC: 1.8 MG/DL — SIGNIFICANT CHANGE UP (ref 1.8–2.4)
MCHC RBC-ENTMCNC: 30.6 PG — SIGNIFICANT CHANGE UP (ref 27–31)
MCHC RBC-ENTMCNC: 34.5 G/DL — SIGNIFICANT CHANGE UP (ref 32–37)
MCV RBC AUTO: 88.6 FL — SIGNIFICANT CHANGE UP (ref 81–99)
MONOCYTES # BLD AUTO: 0.92 K/UL — HIGH (ref 0.1–0.6)
MONOCYTES NFR BLD AUTO: 7.4 % — SIGNIFICANT CHANGE UP (ref 1.7–9.3)
NEUTROPHILS # BLD AUTO: 8.1 K/UL — HIGH (ref 1.4–6.5)
NEUTROPHILS NFR BLD AUTO: 65.1 % — SIGNIFICANT CHANGE UP (ref 42.2–75.2)
NON HDL CHOLESTEROL: 86 MG/DL — SIGNIFICANT CHANGE UP
NRBC # BLD: 0 /100 WBCS — SIGNIFICANT CHANGE UP (ref 0–0)
PLATELET # BLD AUTO: 306 K/UL — SIGNIFICANT CHANGE UP (ref 130–400)
POTASSIUM SERPL-MCNC: 4.3 MMOL/L — SIGNIFICANT CHANGE UP (ref 3.5–5)
POTASSIUM SERPL-SCNC: 4.3 MMOL/L — SIGNIFICANT CHANGE UP (ref 3.5–5)
PROT SERPL-MCNC: 5.8 G/DL — LOW (ref 6–8)
RBC # BLD: 3.86 M/UL — LOW (ref 4.2–5.4)
RBC # FLD: 13 % — SIGNIFICANT CHANGE UP (ref 11.5–14.5)
SODIUM SERPL-SCNC: 136 MMOL/L — SIGNIFICANT CHANGE UP (ref 135–146)
TRIGL SERPL-MCNC: 110 MG/DL — SIGNIFICANT CHANGE UP
WBC # BLD: 12.44 K/UL — HIGH (ref 4.8–10.8)
WBC # FLD AUTO: 12.44 K/UL — HIGH (ref 4.8–10.8)

## 2021-08-15 PROCEDURE — 70551 MRI BRAIN STEM W/O DYE: CPT | Mod: 26

## 2021-08-15 PROCEDURE — 99232 SBSQ HOSP IP/OBS MODERATE 35: CPT

## 2021-08-15 PROCEDURE — 99233 SBSQ HOSP IP/OBS HIGH 50: CPT

## 2021-08-15 RX ORDER — ASPIRIN/CALCIUM CARB/MAGNESIUM 324 MG
81 TABLET ORAL DAILY
Refills: 0 | Status: DISCONTINUED | OUTPATIENT
Start: 2021-08-15 | End: 2021-08-16

## 2021-08-15 RX ADMIN — Medication 650 MILLIGRAM(S): at 05:14

## 2021-08-15 RX ADMIN — Medication 650 MILLIGRAM(S): at 13:15

## 2021-08-15 RX ADMIN — Medication 8.61 MICROGRAM(S)/KG/MIN: at 07:49

## 2021-08-15 RX ADMIN — SODIUM CHLORIDE 75 MILLILITER(S): 9 INJECTION INTRAMUSCULAR; INTRAVENOUS; SUBCUTANEOUS at 21:25

## 2021-08-15 RX ADMIN — PANTOPRAZOLE SODIUM 40 MILLIGRAM(S): 20 TABLET, DELAYED RELEASE ORAL at 14:54

## 2021-08-15 RX ADMIN — CHLORHEXIDINE GLUCONATE 1 APPLICATION(S): 213 SOLUTION TOPICAL at 17:03

## 2021-08-15 RX ADMIN — Medication 650 MILLIGRAM(S): at 14:14

## 2021-08-15 RX ADMIN — Medication 1 MILLIGRAM(S): at 12:28

## 2021-08-15 RX ADMIN — SODIUM CHLORIDE 75 MILLILITER(S): 9 INJECTION INTRAMUSCULAR; INTRAVENOUS; SUBCUTANEOUS at 07:49

## 2021-08-15 RX ADMIN — Medication 81 MILLIGRAM(S): at 14:54

## 2021-08-15 RX ADMIN — Medication 8.61 MICROGRAM(S)/KG/MIN: at 21:19

## 2021-08-15 RX ADMIN — ATORVASTATIN CALCIUM 80 MILLIGRAM(S): 80 TABLET, FILM COATED ORAL at 21:26

## 2021-08-15 RX ADMIN — Medication 650 MILLIGRAM(S): at 04:44

## 2021-08-15 NOTE — PROGRESS NOTE ADULT - ASSESSMENT
46y year old Female presenting with left sided weakness and numbness s/p TPA in the ED.     CT head: No acute intracranial pathology.  Normal CT angiogram of the head and neck.  Normal perfusion images of the brain.    A&P    # acute CVA w/ left sided weakness, right facial droop,  s/p TPA  Repeat CTH - normal   - continue aspirin and Lipitor  - ECHO  - Hypercoagulable labs, LDL, hgba1c  - MRI brain w/o DAVONTE  Neurology following    # hypotension- on levophed; target -140    -give fluid bolus   - wean Levo as tolerated

## 2021-08-15 NOTE — PROGRESS NOTE ADULT - SUBJECTIVE AND OBJECTIVE BOX
Patient reports her L side is now back to normal      T(F): 97.1 (08-15-21 @ 07:10), Max: 97.7 (08-14-21 @ 15:02)  HR: 54 (08-15-21 @ 09:03)  BP: 150/76 (08-15-21 @ 09:03)  RR: 20 (08-15-21 @ 09:03)  SpO2: 99% (08-15-21 @ 09:03) (96% - 100%)    PHYSICAL EXAM:  GENERAL: NAD  HEAD:  Atraumatic, Normocephalic  EYES: EOMI, PERRLA, conjunctiva and sclera clear  NERVOUS SYSTEM:  Alert & Oriented X3, no focal deficits   CHEST/LUNG: Clear to percussion bilaterally; No rales, rhonchi, wheezing, or rubs  HEART: Regular rate and rhythm; No murmurs, rubs, or gallops  ABDOMEN: Soft, Nontender, Nondistended; Bowel sounds present  EXTREMITIES:  2+ Peripheral Pulses, No clubbing, cyanosis, or edema    LABS  08-15    136  |  106  |  13  ----------------------------<  186<H>  4.3   |  20  |  0.5<L>    Ca    8.3<L>      15 Aug 2021 05:38  Mg     1.8     08-15    TPro  5.8<L>  /  Alb  3.6  /  TBili  <0.2  /  DBili  x   /  AST  25  /  ALT  24  /  AlkPhos  78  08-15                          11.8   12.44 )-----------( 306      ( 15 Aug 2021 05:38 )             34.2     PT/INR - ( 13 Aug 2021 17:50 )   PT: 13.20 sec;   INR: 1.15 ratio         PTT - ( 13 Aug 2021 17:50 )  PTT:33.7 sec    CARDIAC ENZYMES      Troponin T, Serum: <0.01 ng/mL (08-13-21 @ 17:50)    < from: 12 Lead ECG (08.14.21 @ 08:22) >    Diagnosis Line Sinus bradycardia  Otherwise normal ECG      < end of copied text >      RADIOLOGY  < from: CT Head No Cont (08.14.21 @ 18:25) >    IMPRESSION:    No CT evidence of acute intracranial hemorrhage.      < end of copied text >  < from: CT Angio Neck w/ IV Cont (08.13.21 @ 18:41) >  Impression:    Normal CT angiogram of the head and neck.    Normal perfusion images of the brain.    < end of copied text >  < from: CT Brain Stroke Protocol (08.13.21 @ 17:40) >    IMPRESSION:    No acute intracranial pathology.      < end of copied text >    MEDICATIONS  (STANDING):  aspirin  chewable 81 milliGRAM(s) Oral daily  atorvastatin 80 milliGRAM(s) Oral at bedtime  chlorhexidine 4% Liquid 1 Application(s) Topical every 12 hours  norepinephrine Infusion 0.05 MICROgram(s)/kG/Min (8.61 mL/Hr) IV Continuous <Continuous>  pantoprazole  Injectable 40 milliGRAM(s) IV Push daily  sodium chloride 0.9%. 1000 milliLiter(s) (75 mL/Hr) IV Continuous <Continuous>    MEDICATIONS  (PRN):  acetaminophen    Suspension .. 650 milliGRAM(s) Oral every 6 hours PRN Mild Pain (1 - 3)  aluminum hydroxide/magnesium hydroxide/simethicone Suspension 30 milliLiter(s) Oral every 4 hours PRN Dyspepsia  LORazepam   Injectable 1 milliGRAM(s) IV Push once PRN Anxiety

## 2021-08-15 NOTE — PROGRESS NOTE ADULT - ASSESSMENT
IMPRESSION:  Stroke ?? s/p TPA  hypotensive     PLAN:    CNS: neuro consult   neuro Q 1hr   MRI       HEENT: oral care     PULMONARY: keep pox > 92 %       CARDIOVASCULAR: need echo / bubble as per neurology   start asa for now   statin   on levo target keep SBP around 130-140 follow neurology     GI: GI prophylaxis. speech and swallow eval     RENAL:follow lytes   IS and os     INFECTIOUS DISEASE:no abx     HEMATOLOGICAL:  DVT prophylaxis. start heparin SQ     ENDOCRINE:  Follow up FS.  Insulin protocol if needed.  rehab/ pt   follow with neurology if can downgrade to tele and neuro Q 4 hrs         CRITICAL CARE TIME SPENT: ***   IMPRESSION:  Stroke ?? s/p TPA  hypotensive     PLAN:    CNS: neuro consult   neuro Q 1hr   MRI       HEENT: oral care     PULMONARY: keep pox > 92 %       CARDIOVASCULAR: need echo / bubble as per neurology   do [place]   start asa for now   statin   on levo target keep SBP around 130-140 follow neurology     GI: GI prophylaxis. speech and swallow eval     RENAL:follow lytes   IS and os     INFECTIOUS DISEASE:no abx     HEMATOLOGICAL:  DVT prophylaxis. start heparin SQ     ENDOCRINE:  Follow up FS.  Insulin protocol if needed. check cortisol level   rehab/ pt   follow with neurology if can downgrade to tele and neuro Q 4 hrs         CRITICAL CARE TIME SPENT: ***

## 2021-08-15 NOTE — PROGRESS NOTE ADULT - SUBJECTIVE AND OBJECTIVE BOX
Patient is a 46y old  Female who presents with a chief complaint of acute CVA (14 Aug 2021 12:22)      Over Night Events:  Patient seen and examined.   feel better   ct scan no bleed     ROS:  See HPI    PHYSICAL EXAM    ICU Vital Signs Last 24 Hrs  T(C): 36.2 (15 Aug 2021 03:01), Max: 36.5 (14 Aug 2021 15:02)  T(F): 97.2 (15 Aug 2021 03:01), Max: 97.7 (14 Aug 2021 15:02)  HR: 51 (15 Aug 2021 06:00) (45 - 73)  BP: 134/68 (15 Aug 2021 06:00) (98/57 - 175/79)  BP(mean): 95 (15 Aug 2021 06:00) (73 - 114)  ABP: --  ABP(mean): --  RR: 16 (15 Aug 2021 06:00) (14 - 29)  SpO2: 97% (14 Aug 2021 21:30) (96% - 100%)      General: AOx3  HEENT:   lev             Lymph Nodes: NO cervical LN   Lungs: Bilateral BS  Cardiovascular: Regular   Abdomen: Soft, Positive BS  Extremities: No clubbing   Skin: warm   Neurological: no focal   Musculoskeletal: move all ext     I&O's Detail    14 Aug 2021 07:01  -  15 Aug 2021 07:00  --------------------------------------------------------  IN:    Lactated Ringers Bolus: 500 mL    Norepinephrine: 455.5 mL    Oral Fluid: 420 mL    sodium chloride 0.9%: 1725 mL  Total IN: 3100.5 mL    OUT:  Total OUT: 0 mL    Total NET: 3100.5 mL          LABS:                          13.1   8.94  )-----------( 288      ( 13 Aug 2021 17:50 )             38.2         13 Aug 2021 17:50    136    |  101    |  12     ----------------------------<  105    3.8     |  19     |  0.7      Ca    9.3        13 Aug 2021 17:50                                               PT/INR - ( 13 Aug 2021 17:50 )   PT: 13.20 sec;   INR: 1.15 ratio         PTT - ( 13 Aug 2021 17:50 )  PTT:33.7 sec                                             CARDIAC MARKERS ( 13 Aug 2021 17:50 )  x     / <0.01 ng/mL / x     / x     / x                                                                                                                                                 MEDICATIONS  (STANDING):  atorvastatin 80 milliGRAM(s) Oral at bedtime  chlorhexidine 4% Liquid 1 Application(s) Topical every 12 hours  lactated ringers Bolus 500 milliLiter(s) IV Bolus once  norepinephrine Infusion 0.05 MICROgram(s)/kG/Min (8.61 mL/Hr) IV Continuous <Continuous>  pantoprazole  Injectable 40 milliGRAM(s) IV Push daily  sodium chloride 0.9%. 1000 milliLiter(s) (75 mL/Hr) IV Continuous <Continuous>    MEDICATIONS  (PRN):  acetaminophen    Suspension .. 650 milliGRAM(s) Oral every 6 hours PRN Mild Pain (1 - 3)  aluminum hydroxide/magnesium hydroxide/simethicone Suspension 30 milliLiter(s) Oral every 4 hours PRN Dyspepsia          Xrays:  TLC:  OG:  ET tube:                                                                                       ECHO:  CAM ICU:           Patient is a 46y old  Female who presents with a chief complaint of acute CVA (14 Aug 2021 12:22)      Over Night Events:  Patient seen and examined.   feel better   ct scan no bleed     ROS:  See HPI    PHYSICAL EXAM    ICU Vital Signs Last 24 Hrs  T(C): 36.2 (15 Aug 2021 03:01), Max: 36.5 (14 Aug 2021 15:02)  T(F): 97.2 (15 Aug 2021 03:01), Max: 97.7 (14 Aug 2021 15:02)  HR: 51 (15 Aug 2021 06:00) (45 - 73)  BP: 134/68 (15 Aug 2021 06:00) (98/57 - 175/79)  BP(mean): 95 (15 Aug 2021 06:00) (73 - 114)  ABP: --  ABP(mean): --  RR: 16 (15 Aug 2021 06:00) (14 - 29)  SpO2: 97% (14 Aug 2021 21:30) (96% - 100%)      General: AOx3  HEENT:   lev             Lymph Nodes: NO cervical LN   Lungs: Bilateral BS  Cardiovascular: Regular   Abdomen: Soft, Positive BS  Extremities: No clubbing   Skin: warm   Neurological: 4plus  in left arm   Musculoskeletal: move all ext     I&O's Detail    14 Aug 2021 07:01  -  15 Aug 2021 07:00  --------------------------------------------------------  IN:    Lactated Ringers Bolus: 500 mL    Norepinephrine: 455.5 mL    Oral Fluid: 420 mL    sodium chloride 0.9%: 1725 mL  Total IN: 3100.5 mL    OUT:  Total OUT: 0 mL    Total NET: 3100.5 mL          LABS:                          13.1   8.94  )-----------( 288      ( 13 Aug 2021 17:50 )             38.2         13 Aug 2021 17:50    136    |  101    |  12     ----------------------------<  105    3.8     |  19     |  0.7      Ca    9.3        13 Aug 2021 17:50                                               PT/INR - ( 13 Aug 2021 17:50 )   PT: 13.20 sec;   INR: 1.15 ratio         PTT - ( 13 Aug 2021 17:50 )  PTT:33.7 sec                                             CARDIAC MARKERS ( 13 Aug 2021 17:50 )  x     / <0.01 ng/mL / x     / x     / x                                                                                                                                                 MEDICATIONS  (STANDING):  atorvastatin 80 milliGRAM(s) Oral at bedtime  chlorhexidine 4% Liquid 1 Application(s) Topical every 12 hours  lactated ringers Bolus 500 milliLiter(s) IV Bolus once  norepinephrine Infusion 0.05 MICROgram(s)/kG/Min (8.61 mL/Hr) IV Continuous <Continuous>  pantoprazole  Injectable 40 milliGRAM(s) IV Push daily  sodium chloride 0.9%. 1000 milliLiter(s) (75 mL/Hr) IV Continuous <Continuous>    MEDICATIONS  (PRN):  acetaminophen    Suspension .. 650 milliGRAM(s) Oral every 6 hours PRN Mild Pain (1 - 3)  aluminum hydroxide/magnesium hydroxide/simethicone Suspension 30 milliLiter(s) Oral every 4 hours PRN Dyspepsia          Xrays:  TLC:  OG:  ET tube:                                                                                       ECHO:  CAM ICU:

## 2021-08-16 ENCOUNTER — TRANSCRIPTION ENCOUNTER (OUTPATIENT)
Age: 46
End: 2021-08-16

## 2021-08-16 VITALS — HEART RATE: 77 BPM | DIASTOLIC BLOOD PRESSURE: 50 MMHG | SYSTOLIC BLOOD PRESSURE: 110 MMHG

## 2021-08-16 LAB
A1C WITH ESTIMATED AVERAGE GLUCOSE RESULT: 5.4 % — SIGNIFICANT CHANGE UP (ref 4–5.6)
AT III ACT/NOR PPP CHRO: 72 % — LOW (ref 85–135)
AT III AG PPP IA-MCNC: 21 MG/DL — SIGNIFICANT CHANGE UP (ref 19–31)
ESTIMATED AVERAGE GLUCOSE: 108 MG/DL — SIGNIFICANT CHANGE UP (ref 68–114)

## 2021-08-16 PROCEDURE — 99232 SBSQ HOSP IP/OBS MODERATE 35: CPT

## 2021-08-16 PROCEDURE — 99239 HOSP IP/OBS DSCHRG MGMT >30: CPT

## 2021-08-16 RX ORDER — ATORVASTATIN CALCIUM 80 MG/1
1 TABLET, FILM COATED ORAL
Qty: 30 | Refills: 0
Start: 2021-08-16 | End: 2021-09-14

## 2021-08-16 RX ORDER — BUDESONIDE AND FORMOTEROL FUMARATE DIHYDRATE 160; 4.5 UG/1; UG/1
2 AEROSOL RESPIRATORY (INHALATION)
Refills: 0 | Status: DISCONTINUED | OUTPATIENT
Start: 2021-08-16 | End: 2021-08-16

## 2021-08-16 RX ORDER — ALBUTEROL 90 UG/1
2 AEROSOL, METERED ORAL
Qty: 1 | Refills: 0
Start: 2021-08-16

## 2021-08-16 RX ORDER — ASPIRIN/CALCIUM CARB/MAGNESIUM 324 MG
1 TABLET ORAL
Qty: 30 | Refills: 0
Start: 2021-08-16 | End: 2021-09-14

## 2021-08-16 RX ORDER — MIDODRINE HYDROCHLORIDE 2.5 MG/1
1 TABLET ORAL
Qty: 42 | Refills: 0
Start: 2021-08-16 | End: 2021-08-29

## 2021-08-16 RX ORDER — ENOXAPARIN SODIUM 100 MG/ML
40 INJECTION SUBCUTANEOUS AT BEDTIME
Refills: 0 | Status: DISCONTINUED | OUTPATIENT
Start: 2021-08-16 | End: 2021-08-16

## 2021-08-16 RX ORDER — TIOTROPIUM BROMIDE 18 UG/1
1 CAPSULE ORAL; RESPIRATORY (INHALATION)
Qty: 30 | Refills: 0
Start: 2021-08-16 | End: 2021-09-14

## 2021-08-16 RX ORDER — IPRATROPIUM/ALBUTEROL SULFATE 18-103MCG
3 AEROSOL WITH ADAPTER (GRAM) INHALATION EVERY 6 HOURS
Refills: 0 | Status: DISCONTINUED | OUTPATIENT
Start: 2021-08-16 | End: 2021-08-16

## 2021-08-16 RX ORDER — BUDESONIDE AND FORMOTEROL FUMARATE DIHYDRATE 160; 4.5 UG/1; UG/1
2 AEROSOL RESPIRATORY (INHALATION)
Qty: 1 | Refills: 0
Start: 2021-08-16

## 2021-08-16 RX ADMIN — Medication 30 MILLILITER(S): at 06:33

## 2021-08-16 RX ADMIN — Medication 8.61 MICROGRAM(S)/KG/MIN: at 07:30

## 2021-08-16 RX ADMIN — Medication 81 MILLIGRAM(S): at 11:24

## 2021-08-16 RX ADMIN — PANTOPRAZOLE SODIUM 40 MILLIGRAM(S): 20 TABLET, DELAYED RELEASE ORAL at 11:25

## 2021-08-16 RX ADMIN — Medication 650 MILLIGRAM(S): at 07:08

## 2021-08-16 RX ADMIN — SODIUM CHLORIDE 75 MILLILITER(S): 9 INJECTION INTRAMUSCULAR; INTRAVENOUS; SUBCUTANEOUS at 04:23

## 2021-08-16 RX ADMIN — CHLORHEXIDINE GLUCONATE 1 APPLICATION(S): 213 SOLUTION TOPICAL at 06:00

## 2021-08-16 RX ADMIN — Medication 650 MILLIGRAM(S): at 14:05

## 2021-08-16 RX ADMIN — Medication 650 MILLIGRAM(S): at 06:08

## 2021-08-16 NOTE — PROGRESS NOTE ADULT - SUBJECTIVE AND OBJECTIVE BOX
Patient seen and evaluated this am, and again in the afternoon prior to discharge      T(F): 97.6 (08-16-21 @ 15:30), Max: 98.2 (08-16-21 @ 11:00)  HR: 77 (08-16-21 @ 16:11)  BP: 110/50 (08-16-21 @ 16:11)  RR: 18 (08-16-21 @ 11:00)  SpO2: 100% (08-16-21 @ 09:00) (96% - 100%)    PHYSICAL EXAM:  GENERAL: NAD  HEAD:  Atraumatic, Normocephalic  EYES: EOMI, PERRLA, conjunctiva and sclera clear  NERVOUS SYSTEM:  Alert & Oriented X3, no focal deficits   CHEST/LUNG: Clear to percussion bilaterally; No rales, rhonchi, wheezing, or rubs  HEART: Regular rate and rhythm; No murmurs, rubs, or gallops  ABDOMEN: Soft, Nontender, Nondistended; Bowel sounds present  EXTREMITIES:  2+ Peripheral Pulses, No clubbing, cyanosis, or edema    LABS  08-15    136  |  106  |  13  ----------------------------<  186<H>  4.3   |  20  |  0.5<L>    Ca    8.3<L>      15 Aug 2021 05:38  Mg     1.8     08-15    TPro  5.8<L>  /  Alb  3.6  /  TBili  <0.2  /  DBili  x   /  AST  25  /  ALT  24  /  AlkPhos  78  08-15                          11.8   12.44 )-----------( 306      ( 15 Aug 2021 05:38 )             34.2     2DECHO - prelim normal LV size and function    RADIOLOGY  < from: MR Head No Cont (08.15.21 @ 13:05) >  IMPRESSION:    1.  Motion limited study.    2.  Questionable punctate acute infarct within the right parietal lobe (versus artifact), series 3 image 26.    3.  Questionable focus of cortical/subcortical signal abnormality within the right frontal lobe (versus artifact), series 6,7 image 19.    < end of copied text >  < from: CT Head No Cont (08.14.21 @ 18:25) >    IMPRESSION:    No CT evidence of acute intracranial hemorrhage.      < end of copied text >  < from: CT Angio Neck w/ IV Cont (08.13.21 @ 18:41) >    Impression:    Normal CT angiogram of the head and neck.    Normal perfusion images of the brain.    < end of copied text >  < from: CT Brain Stroke Protocol (08.13.21 @ 17:40) >  IMPRESSION:    No acute intracranial pathology.      < end of copied text >    MEDICATIONS  (STANDING):  aspirin  chewable 81 milliGRAM(s) Oral daily  atorvastatin 80 milliGRAM(s) Oral at bedtime  budesonide  80 MICROgram(s)/formoterol 4.5 MICROgram(s) Inhaler 2 Puff(s) Inhalation two times a day  chlorhexidine 4% Liquid 1 Application(s) Topical every 12 hours  enoxaparin Injectable 40 milliGRAM(s) SubCutaneous at bedtime  pantoprazole  Injectable 40 milliGRAM(s) IV Push daily    MEDICATIONS  (PRN):  acetaminophen    Suspension .. 650 milliGRAM(s) Oral every 6 hours PRN Mild Pain (1 - 3)  albuterol/ipratropium for Nebulization 3 milliLiter(s) Nebulizer every 6 hours PRN Shortness of Breath and/or Wheezing  aluminum hydroxide/magnesium hydroxide/simethicone Suspension 30 milliLiter(s) Oral every 4 hours PRN Dyspepsia

## 2021-08-16 NOTE — PROGRESS NOTE ADULT - ASSESSMENT
IMPRESSION:  Stroke ?? s/p TPA  hypotensive     PLAN:    CNS: neuro consult   neuro Q 4H  MRI report noted      HEENT: oral care     PULMONARY: keep pox > 92 %       CARDIOVASCULAR: F/u echo with bubble    on asa/statin,   wean levo    GI: GI prophylaxis.     RENAL:follow lytes   IS and os     INFECTIOUS DISEASE:no abx     HEMATOLOGICAL:  DVT prophylaxis.  Lovenox    ENDOCRINE:  Follow up FS.  Insulin protocol if needed.  rehab/ pt   Tele

## 2021-08-16 NOTE — PROGRESS NOTE ADULT - SUBJECTIVE AND OBJECTIVE BOX
Neurology Progress Note    Interval History:  Pt seen at bedside with Dr. Feliz. No acute interval events. Pt's daughter at bedside providing collateral. Pt now reports complete resolution of symptoms, but when she came in she was experiencing L sided numbness, L facial droop, and dysarthria. Pt did recieve tpA in ED; 24hr repeat head CT w/o bleed. CTA negative for stenosis. MRI brain: questionable punctate acute infarct in the R parietal lobe. Pt also reports increased snoring recently w/ episodes of sudden waking. Denies HA, dizziness, fever/chills, SOB, chest pain, NVD, abdominal pain, change in urination and change in BM.    HPI:    Patient is a 46y old  Female who presents with a chief complaint of acute eft sided weakness w/ right facial droop starting this afternoon @ 4 pm .  Pt was brought to ER , stroke code. Neuro was called and TPA was offered and was accepted by pt. Pt vapes but denies illict drug use      (13 Aug 2021 20:54)      PAST MEDICAL & SURGICAL HISTORY:  Asthma  Anxiety and depression  Vapes nicotine containing substance  S/P hernia repair  H/O cervical spine surgery        Medications:  acetaminophen    Suspension .. 650 milliGRAM(s) Oral every 6 hours PRN  aluminum hydroxide/magnesium hydroxide/simethicone Suspension 30 milliLiter(s) Oral every 4 hours PRN  aspirin  chewable 81 milliGRAM(s) Oral daily  atorvastatin 80 milliGRAM(s) Oral at bedtime  chlorhexidine 4% Liquid 1 Application(s) Topical every 12 hours  enoxaparin Injectable 40 milliGRAM(s) SubCutaneous at bedtime  norepinephrine Infusion 0.05 MICROgram(s)/kG/Min IV Continuous <Continuous>  pantoprazole  Injectable 40 milliGRAM(s) IV Push daily      Vital Signs Last 24 Hrs  T(C): 36.8 (16 Aug 2021 11:00), Max: 36.8 (16 Aug 2021 11:00)  T(F): 98.2 (16 Aug 2021 11:00), Max: 98.2 (16 Aug 2021 11:00)  HR: 60 (16 Aug 2021 11:16) (52 - 70)  BP: 91/52 (16 Aug 2021 11:16) (91/52 - 164/75)  BP(mean): 66 (16 Aug 2021 11:16) (66 - 108)  RR: 18 (16 Aug 2021 11:00) (13 - 26)  SpO2: 100% (16 Aug 2021 09:00) (96% - 100%)    Neurological Exam:   Mental status: Awake, alert and oriented x3.  Recent and remote memory intact.  Naming, repetition and comprehension intact.  Attention/concentration intact.  No dysarthria, no aphasia.  Fund of knowledge appropriate.    Cranial nerves: Pupils equally round and reactive to light, visual fields full, no nystagmus, extraocular muscles intact, V1 through V3 intact bilaterally and symmetric, face symmetric, hearing intact to finger rub, palate elevation symmetric, tongue was midline.  Motor:  MRC grading 5/5 b/l UE/LE.   strength 5/5.  Normal tone and bulk.  No abnormal movements.    Sensation: Intact to light touch, proprioception, and pinprick.   Coordination: No dysmetria on finger-to-nose and heel-to-shin.  No dysdiadokinesia.        NIHSS: 0     Labs:  CBC Full  -  ( 15 Aug 2021 05:38 )  WBC Count : 12.44 K/uL  RBC Count : 3.86 M/uL  Hemoglobin : 11.8 g/dL  Hematocrit : 34.2 %  Platelet Count - Automated : 306 K/uL  Mean Cell Volume : 88.6 fL  Mean Cell Hemoglobin : 30.6 pg  Mean Cell Hemoglobin Concentration : 34.5 g/dL  Auto Neutrophil # : 8.10 K/uL  Auto Lymphocyte # : 3.20 K/uL  Auto Monocyte # : 0.92 K/uL  Auto Eosinophil # : 0.10 K/uL  Auto Basophil # : 0.06 K/uL  Auto Neutrophil % : 65.1 %  Auto Lymphocyte % : 25.7 %  Auto Monocyte % : 7.4 %  Auto Eosinophil % : 0.8 %  Auto Basophil % : 0.5 %    08-15    136  |  106  |  13  ----------------------------<  186<H>  4.3   |  20  |  0.5<L>    Ca    8.3<L>      15 Aug 2021 05:38  Mg     1.8     08-15    TPro  5.8<L>  /  Alb  3.6  /  TBili  <0.2  /  DBili  x   /  AST  25  /  ALT  24  /  AlkPhos  78  08-15    LIVER FUNCTIONS - ( 15 Aug 2021 05:38 )  Alb: 3.6 g/dL / Pro: 5.8 g/dL / ALK PHOS: 78 U/L / ALT: 24 U/L / AST: 25 U/L / GGT: x               < from: MR Head No Cont (08.15.21 @ 13:05) >    IMPRESSION:    1.  Motion limited study.    2.  Questionable punctate acute infarct within the right parietal lobe (versus artifact), series 3 image 26.    3.  Questionable focus of cortical/subcortical signal abnormality within the right frontal lobe (versus artifact), series 6,7 image 19.    4.  Recommend a repeat study with appropriate sedation and intravenous contrast.    --- End of Report ---              GORAN PALOMINO MD; Attending Radiologist  This document has been electronically signed. Aug 16 2021  8:53AM    < end of copied text >      < from: CT Head No Cont (08.14.21 @ 18:25) >    IMPRESSION:    No CT evidence of acute intracranial hemorrhage.    --- End of Report ---              CLAUDY HASKINS MD; Attending Radiologist  This document has been electronically signed. Aug 14 2021  6:37PM    < end of copied text >        < from: CT Angio Neck w/ IV Cont (08.13.21 @ 18:41) >  Impression:    Normal CT angiogram of the head and neck.    Normal perfusion images of the brain.    --- End of Report ---            ALEJANDRO MEZA MD; Resident Radiologist  This document has been electronically signed.  TRE HINES MD; Attending Interventional Radiologist  This document has been electronically signed. Aug 13 2021  8:00PM    < end of copied text >

## 2021-08-16 NOTE — PROGRESS NOTE ADULT - ASSESSMENT
ASSESSMENT: Pt is a 46F w/ PMH asthma and smoking hx admitted for acute CVA. MRI showing acute punctate infarct in the right parietal lobe. Sx now resolved.       PLAN: Case d/w Dr. Feliz  - MRI appreciated. Showing acute punctate infarct in R parietal lobe   - CTA appreciated. No stenosis.   - Pt s/p tpA  - Smoking cessation   - Start ASA 81 QD, needs loading dose of 325mg if not given previously on this admission  - Continue high intensity statin  - F/u ECHO results   - Can d/c neuro checks   - Needs outpatient follow up with sleep medicine for r/o TALI   - Needs outpatient follow up with the stroke clinic upon discharge  - Continue medical management

## 2021-08-16 NOTE — DISCHARGE NOTE PROVIDER - HOSPITAL COURSE
46F w/ PMHx of Asthma, Anxiety/Depression presents with a chief complaint of acute eft sided weakness w/ right facial droop starting this afternoon @ 4 pm .  Pt was brought to ER , stroke code. Neuro was called and TPA was offered and was accepted by pt. Since TPA she believes her symptoms have improved.  CTH/CTAH&N/CT perfusion brain normal noted. Pt was started on levophed due to low bp however pt is low BP at baseline - per am rounds will titrate off levophed. Serum cortisol level might be low due to chronic ICS. Pt is stable and being downgraded to the floor     ASSESSMENT & PLAN:     # Left sided weakness due to Rt acute punctate infarct  - CTH/CTAH&N/CT perfusion brain normal noted  - s/p TPA  - repeat CTH negative for bleed  - MRI : acute punctate infarct of Rt parietal lobe  - Carotid duplex: 50-69 Rt ICA stenosis, <50% left ICA stenosis  - VA duplex negative for DVT   - c/w Aspirin and statin  - o/p sleep medicine and stroke clinic follow up  - f/u Echo with bubble study  - hypercoaguable work up is sent - AT III deficiency?    # Asthma  - c/w duoneb prn, symbicort    # Anxiety/Depression - klonopin and fluoxetine    # DVT ppx - Lovenox   # GI ppx - PPI   # Diet - Regular   # COVID test - Negative  Full code.

## 2021-08-16 NOTE — PROGRESS NOTE ADULT - ATTENDING COMMENTS
Patient examined on 8/16/21.  Neurologic exam improved.  Questionable small punctate infarct noted on MRI brain.  Patient may be discharged home on aspirin with outpatient stroke clinic followup in 1-2 weeks.   Smoking cessation emphasized.  Return if new neurologic symptoms.  I recommended she f/u with cardiologist for 30-day event monitor.

## 2021-08-16 NOTE — PROGRESS NOTE ADULT - ATTENDING COMMENTS
Attending Statement: I have personally performed a face to face diagnostic evaluation on this patient. The patient is suffering from s/p right sided weakness r/o CVA.  I have reviewed the above note and agree with the history, exam and suggestions for care, except as I have noted in the text. I have amended the treatment plans as necessary.

## 2021-08-16 NOTE — DISCHARGE NOTE PROVIDER - PROVIDER TOKENS
PROVIDER:[TOKEN:[[id number]],FOLLOWUP:[2 weeks]],PROVIDER:[TOKEN:[[id number]],FOLLOWUP:[1 week]]

## 2021-08-16 NOTE — CHART NOTE - NSCHARTNOTEFT_GEN_A_CORE
Brief Stroke Note:    Responded remotely to [facility] stroke code. Per report, pt presented with left facial and left leg weakness. NIHSS 4 per ED physician. LKW ~1630. CT head without acute process. Pt was given IV alteplase by the ED physician at 1750. CTA head/neck and CTP pending. Low suspicion for ELVO given description of presenting deficits. pt will likely remain at [facility] for further stroke evaluation. Please use post-tPA stroke orderset.      Pt was discussed with Dr. Renteria.
MICU Transfer Note    Transfer from: MICU  Transfer to:  ( x ) Medicine    (  ) Telemetry    (  ) RCU    (  ) Palliative    (  ) Stroke Unit    (  ) _______________      MEDICATIONS:  STANDING MEDICATIONS  aspirin  chewable 81 milliGRAM(s) Oral daily  atorvastatin 80 milliGRAM(s) Oral at bedtime  chlorhexidine 4% Liquid 1 Application(s) Topical every 12 hours  enoxaparin Injectable 40 milliGRAM(s) SubCutaneous at bedtime  norepinephrine Infusion 0.05 MICROgram(s)/kG/Min IV Continuous <Continuous>  pantoprazole  Injectable 40 milliGRAM(s) IV Push daily    PRN MEDICATIONS  acetaminophen    Suspension .. 650 milliGRAM(s) Oral every 6 hours PRN  aluminum hydroxide/magnesium hydroxide/simethicone Suspension 30 milliLiter(s) Oral every 4 hours PRN      VITAL SIGNS: Last 24 Hours  T(C): 36.8 (16 Aug 2021 11:00), Max: 36.8 (16 Aug 2021 11:00)  T(F): 98.2 (16 Aug 2021 11:00), Max: 98.2 (16 Aug 2021 11:00)  HR: 51 (16 Aug 2021 11:45) (51 - 70)  BP: 109/58 (16 Aug 2021 11:45) (91/52 - 164/75)  BP(mean): 75 (16 Aug 2021 11:45) (66 - 108)  RR: 18 (16 Aug 2021 11:00) (13 - 26)  SpO2: 100% (16 Aug 2021 09:00) (96% - 100%)    LABS:                        11.8   12.44 )-----------( 306      ( 15 Aug 2021 05:38 )             34.2     08-15    136  |  106  |  13  ----------------------------<  186<H>  4.3   |  20  |  0.5<L>    Ca    8.3<L>      15 Aug 2021 05:38  Mg     1.8     08-15    TPro  5.8<L>  /  Alb  3.6  /  TBili  <0.2  /  DBili  x   /  AST  25  /  ALT  24  /  AlkPhos  78  08-15    MICU COURSE: 46F w/ PMHx of Asthma, Anxiety/Depression presents with a chief complaint of acute eft sided weakness w/ right facial droop starting this afternoon @ 4 pm .  Pt was brought to ER , stroke code. Neuro was called and TPA was offered and was accepted by pt. Since TPA she believes her symptoms have improved.  CTH/CTAH&N/CT perfusion brain normal noted. Pt was started on levophed due to low bp however pt is low BP at baseline - per am rounds will titrate off levophed. Serum cortisol level might be low due to chronic ICS. Pt is stable and being downgraded to the floor     ASSESSMENT & PLAN:     # Left sided weakness due to Rt acute punctate infarct  - CTH/CTAH&N/CT perfusion brain normal noted  - s/p TPA  - repeat CTH negative for bleed  - MRI : acute punctate infarct of Rt parietal lobe  - Carotid duplex: 50-69 Rt ICA stenosis, <50% left ICA stenosis  - VA duplex negative for DVT   - c/w Aspirin and statin  - o/p sleep medicine and stroke clinic follow up  - f/u Echo with bubble study  - hypercoaguable work up is sent - AT III deficiency?    # Asthma  - c/w duoneb prn, symbicort    # Anxiety/Depression - klonopin and fluoxetine    # DVT ppx - Lovenox   # GI ppx - PPI   # Diet - Regular   # COVID test - Negative  Full code.
Called by RN, reporting that patient was discharged and the rx's that were sent to the pharmacy were sent to the wrong pharmacy.  Verified all rx's with RN and sent them to the correct pharmacy.

## 2021-08-16 NOTE — CONSULT NOTE ADULT - SUBJECTIVE AND OBJECTIVE BOX
Patient is a 46y old  Female who presents with a chief complaint of acute CVA (13 Aug 2021 20:54)      HPI:    Patient is a 46y old  Female who presents with a chief complaint of acute eft sided weakness w/ right facial droop starting this afternoon @ 4 pm .  Pt was brought to ER , stroke code. Neuro was called and TPA was offered and was accepted by pt. Pt vapes but denies illict drug use   today she feel better her left weakness improved         PAST MEDICAL & SURGICAL HISTORY:  Asthma    Anxiety and depression    Vapes nicotine containing substance    S/P hernia repair    H/O cervical spine surgery      Allergies    No Known Allergies    Intolerances      Family history : no cardiovscular family history   Home Medications:  FLUoxetine:  (13 Aug 2021 19:57)  KlonoPIN:  (13 Aug 2021 19:57)    Occupation:  Alochol: Denied  Smoking: Denied  Drug Use: Denied  Marital Status:         ROS: as in HPI; All other systems reviewed are negative    ICU Vital Signs Last 24 Hrs  T(C): 35.7 (14 Aug 2021 03:00), Max: 36.7 (13 Aug 2021 18:40)  T(F): 96.3 (14 Aug 2021 03:00), Max: 98.1 (13 Aug 2021 18:40)  HR: 52 (14 Aug 2021 06:30) (50 - 92)  BP: 112/72 (14 Aug 2021 06:30) (81/44 - 130/59)  BP(mean): 88 (14 Aug 2021 06:30) (58 - 89)  ABP: --  ABP(mean): --  RR: 24 (14 Aug 2021 06:30) (13 - 34)  SpO2: 100% (14 Aug 2021 06:30) (95% - 100%)        Physical Examination:    General: No acute distress.  Alert, oriented, interactive, nonfocal    HEENT: Pupils equal, reactive to light.  Symmetric.    PULM: Clear to auscultation bilaterally, no significant sputum production    CVS: Regular rate and rhythm, no murmurs, rubs, or gallops    ABD: Soft, nondistended, nontender, normoactive bowel sounds, no masses    EXT: No edema, nontender, no clubbing     SKIN: Warm and well perfused, no rashes noted.    Neurology : motor power 4/5 on left ext right 5/5     Musculoskeletal : move all extremity     Lymphatic system: no Palpable node               I&O's Detail    13 Aug 2021 07:01  -  14 Aug 2021 07:00  --------------------------------------------------------  IN:    Norepinephrine: 27 mL    sodium chloride 0.9%: 600 mL    Sodium Chloride 0.9% Bolus: 2000 mL  Total IN: 2627 mL    OUT:  Total OUT: 0 mL    Total NET: 2627 mL            LABS:                        13.1   8.94  )-----------( 288      ( 13 Aug 2021 17:50 )             38.2     13 Aug 2021 17:50    136    |  101    |  12     ----------------------------<  105    3.8     |  19     |  0.7      Ca    9.3        13 Aug 2021 17:50    TPro  7.6    /  Alb  4.5    /  TBili  0.3    /  DBili  x      /  AST  18     /  ALT  14     /  AlkPhos  95     13 Aug 2021 17:50  Amylase x     lipase x          CARDIAC MARKERS ( 13 Aug 2021 17:50 )  x     / <0.01 ng/mL / x     / x     / x          CAPILLARY BLOOD GLUCOSE      POCT Blood Glucose.: 111 mg/dL (13 Aug 2021 17:30)    PT/INR - ( 13 Aug 2021 17:50 )   PT: 13.20 sec;   INR: 1.15 ratio         PTT - ( 13 Aug 2021 17:50 )  PTT:33.7 sec    Culture        MEDICATIONS  (STANDING):  atorvastatin 80 milliGRAM(s) Oral at bedtime  chlorhexidine 4% Liquid 1 Application(s) Topical every 12 hours  norepinephrine Infusion 0.05 MICROgram(s)/kG/Min (8.61 mL/Hr) IV Continuous <Continuous>  pantoprazole  Injectable 40 milliGRAM(s) IV Push daily  sodium chloride 0.9%. 1000 milliLiter(s) (75 mL/Hr) IV Continuous <Continuous>    MEDICATIONS  (PRN):  acetaminophen    Suspension .. 650 milliGRAM(s) Oral every 6 hours PRN Mild Pain (1 - 3)        RADIOLOGY: ***   < from: CT Brain Stroke Protocol (08.13.21 @ 17:40) >  No acute intracranial pathology.    < end of copied text >  < from: CT Angio Head w/ IV Cont (08.13.21 @ 18:41) >    Normal CT angiogram of the head and neck.    Normal perfusion images of the brain.    < end of copied text >    CXR:  TLC:  OG:  ET tube:        CAM ICU:  ECHO:      
HPI: Patient is a 46y old  Female who presents with a chief complaint of acute left sided weakness w/ right facial droop starting this afternoon @ 4 pm .  Pt was brought to ER , stroke code. Neuro was called and TPA was offered and was accepted by pt. Pt vapes but denies illict drug use  ptn  is Greek  speaking  dtr  is  at  bed  side  speaking  english  seen and  exam  nad  fu  command      PTN  REFERRED TO ACUTE  REHAB  FOR  EVAL AND  TX   PAST MEDICAL & SURGICAL HISTORY:  Asthma    Anxiety and depression    Vapes nicotine containing substance    S/P hernia repair    H/O cervical spine surgery        Hospital Course:    TODAY'S SUBJECTIVE & REVIEW OF SYMPTOMS:     Constitutional WNL   Cardio WNL   Resp WNL   GI WNL  Heme WNL  Endo WNL  Skin WNL  MSK WNL  Neuro WNL  Cognitive WNL  Psych WNL      MEDICATIONS  (STANDING):  aspirin  chewable 81 milliGRAM(s) Oral daily  atorvastatin 80 milliGRAM(s) Oral at bedtime  chlorhexidine 4% Liquid 1 Application(s) Topical every 12 hours  norepinephrine Infusion 0.05 MICROgram(s)/kG/Min (8.61 mL/Hr) IV Continuous <Continuous>  pantoprazole  Injectable 40 milliGRAM(s) IV Push daily  sodium chloride 0.9%. 1000 milliLiter(s) (75 mL/Hr) IV Continuous <Continuous>    MEDICATIONS  (PRN):  acetaminophen    Suspension .. 650 milliGRAM(s) Oral every 6 hours PRN Mild Pain (1 - 3)  aluminum hydroxide/magnesium hydroxide/simethicone Suspension 30 milliLiter(s) Oral every 4 hours PRN Dyspepsia      FAMILY HISTORY:  Family history of CVA (Mother)        Allergies    No Known Allergies    Intolerances        SOCIAL HISTORY:    [  ] Etoh  [  ] Smoking  [  ] Substance abuse     Home Environment:  [  ] Home Alone  [  x] Lives with Family  [  ] Home Health Aid    Dwelling:  [  ] Apartment  [ x ] Private House  [  ] Adult Home  [  ] Skilled Nursing Facility      [  ] Short Term  [  ] Long Term  [ x] Stairs       Elevator [  ]    FUNCTIONAL STATUS PTA: (Check all that apply)  Ambulation: [ x  ]Independent    [  ] Dependent     [  ] Non-Ambulatory  Assistive Device: [  ] SA Cane  [  ]  Q Cane  [  ] Walker  [  ]  Wheelchair  ADL : [ x ] Independent  [  ]  Dependent       Vital Signs Last 24 Hrs  T(C): 35.6 (16 Aug 2021 07:01), Max: 36.3 (15 Aug 2021 23:01)  T(F): 96 (16 Aug 2021 07:01), Max: 97.4 (15 Aug 2021 23:01)  HR: 55 (16 Aug 2021 07:05) (52 - 70)  BP: 141/67 (16 Aug 2021 07:05) (112/60 - 164/75)  BP(mean): 98 (16 Aug 2021 07:05) (79 - 108)  RR: 20 (16 Aug 2021 07:05) (13 - 26)  SpO2: 100% (16 Aug 2021 07:05) (96% - 100%)      PHYSICAL EXAM: Alert & Oriented X3  GENERAL: NAD, well-groomed, well-developed  HEAD:  Atraumatic, Normocephalic  EYES: EOMI, PERRLA, conjunctiva and sclera clear  NECK: Supple, No JVD, Normal thyroid  CHEST/LUNG: Clear to percussion bilaterally; No rales, rhonchi, wheezing, or rubs  HEART: Regular rate and rhythm; No murmurs, rubs, or gallops  ABDOMEN: Soft, Nontender, Nondistended; Bowel sounds present  EXTREMITIES:  2+ Peripheral Pulses, No clubbing, cyanosis, or edema    NERVOUS SYSTEM:  Cranial Nerves 2-12 intact [ x ] Abnormal  [  ]  ROM: WFL all extremities [x  ]  Abnormal [  ]  Motor Strength: WFL all extremities  [x  ]  Abnormal [  ]  Sensation: intact to light touch [  x] Abnormal [  ]  Reflexes: Symmetric [  x]  Abnormal [  ]    FUNCTIONAL STATUS:  Bed Mobility: Independent [  ]  Supervision [ x ]  Needs Assistance [  ]  N/A [  ]  Transfers: Independent [  ]  Supervision [ x ]  Needs Assistance [  ]  N/A [  ]   Ambulation: Independent [  ]  Supervision [x  ]  Needs Assistance [  ]  N/A [  ]  ADL: Independent [ x ] Requires Assistance [  ] N/A [  ]  SEE PT/OT IE NOTES    LABS:                        11.8   12.44 )-----------( 306      ( 15 Aug 2021 05:38 )             34.2     08-15    136  |  106  |  13  ----------------------------<  186<H>  4.3   |  20  |  0.5<L>    Ca    8.3<L>      15 Aug 2021 05:38  Mg     1.8     08-15    TPro  5.8<L>  /  Alb  3.6  /  TBili  <0.2  /  DBili  x   /  AST  25  /  ALT  24  /  AlkPhos  78  08-15          RADIOLOGY & ADDITIONAL STUDIES:< from: CT Head No Cont (08.14.21 @ 18:25) >  IMPRESSION:    No CT evidence of acute intracranial hemorrhage.    < end of copied text >      Assesment:
MALDONADO OJEDA     46y     Female    MRN-[id number]                                                           CC:Patient is a 46y old  Female who presents with a chief complaint of acute CVA (14 Aug 2021 07:01)      HPI:    Patient is a 46y old  Female who presents with a chief complaint of acute eft sided weakness w/ right facial droop starting this afternoon @ 4 pm .  Pt was brought to ER , stroke code. Neuro was called and TPA was offered and was accepted by pt. Pt vapes but denies illict drug use      (13 Aug 2021 20:54)    Patient seen and examined and history obtained from EMR, patient and daughter.  Patient had sudden onset of facial weakness and felt weak on the left side.  She was a candidate for TPA and was given TPA in the ED.  Since TPA she believes her symptoms have improved.  HAs a 8/10 headache this morning    ROS:  Constitutional, Neurological, Psychiatric, Eyes, ENT, Cardiovascular, Respiratory, Gastrointestinal, Genitourinary, Musculoskeletal, Integumentary, Endocrine and Heme/Lymph are otherwise negative. Except for noted above    Social History: NoO smoking, No drinking, No drug use    FAMILY HISTORY:  Family history of CVA (Mother)        HEALTH ISSUES - PROBLEM Dx:  Acute cerebrovascular accident (CVA)            Vital Signs Last 24 Hrs  T(C): 35.8 (14 Aug 2021 07:10), Max: 36.7 (13 Aug 2021 18:40)  T(F): 96.5 (14 Aug 2021 07:10), Max: 98.1 (13 Aug 2021 18:40)  HR: 52 (14 Aug 2021 06:30) (50 - 92)  BP: 112/72 (14 Aug 2021 06:30) (81/44 - 130/59)  BP(mean): 88 (14 Aug 2021 06:30) (58 - 89)  RR: 21 (14 Aug 2021 07:10) (13 - 34)  SpO2: 100% (14 Aug 2021 06:30) (95% - 100%)    Physical Exam:  Constitutional: alert and in no acute distress.  Eyes: the sclera and conjunctiva were normal, pupils were equal in size, round, reactive to light, with normal accommodation and extraocular movements were intact.   Back: no costovertebral angle tenderness and no spinal tenderness.      Neuro Exam:  a+Ox3 language and attention normal  CN 2-12 normal except for a left facial  No drift in UE however LUE 4+/5 and drift in LLE with power 4/5  Sensory decreased to Temp, Vib, LT on left  DTR 1+  FTB BK      NIHSS: 3  mrankin 0    Allergies    No Known Allergies    Intolerances       Home Medications:  FLUoxetine:  (13 Aug 2021 19:57)  KlonoPIN:  (13 Aug 2021 19:57)      MEDICATIONS  (STANDING):  atorvastatin 80 milliGRAM(s) Oral at bedtime  chlorhexidine 4% Liquid 1 Application(s) Topical every 12 hours  lactated ringers Bolus 500 milliLiter(s) IV Bolus once  norepinephrine Infusion 0.05 MICROgram(s)/kG/Min (8.61 mL/Hr) IV Continuous <Continuous>  pantoprazole  Injectable 40 milliGRAM(s) IV Push daily  sodium chloride 0.9%. 1000 milliLiter(s) (75 mL/Hr) IV Continuous <Continuous>    MEDICATIONS  (PRN):  acetaminophen    Suspension .. 650 milliGRAM(s) Oral every 6 hours PRN Mild Pain (1 - 3)      LABS:                        13.1   8.94  )-----------( 288      ( 13 Aug 2021 17:50 )             38.2     08-13    136  |  101  |  12  ----------------------------<  105<H>  3.8   |  19  |  0.7    Ca    9.3      13 Aug 2021 17:50    TPro  7.6  /  Alb  4.5  /  TBili  0.3  /  DBili  x   /  AST  18  /  ALT  14  /  AlkPhos  95  08-13    PT/INR - ( 13 Aug 2021 17:50 )   PT: 13.20 sec;   INR: 1.15 ratio         PTT - ( 13 Aug 2021 17:50 )  PTT:33.7 sec        Neuro Imaging:  NCT:   < from: CT Angio Neck w/ IV Cont (08.13.21 @ 18:41) >      Findings:    CTA head:    The distal internal carotid arteries, middle cerebral arteries and anterior cerebral arteries are unremarkable without significant stenosis. The basilar artery is patent.    No aneurysm or vascular malformation is identified.    CTA neck:    The aortic arch, origin of the great vessels and subclavian arteries are unremarkable. The bilateral common carotid arteries and internal carotid arteries are unremarkable without significant stenosis seen. The vertebral arteries are codominant. No significant vertebral artery stenosis is noted.      CT perfusion:    The cerebral blood volume and time to peak images demonstrate no significant evidence of fixed or mismatched focal perfusion deficit or surrounding ischemic penumbra to suggest acute cerebral ischemia or infarct.    Other:  Cervical spine hardware is noted. Multilevel degenerative changes of the spine.    Impression:    Normal CT angiogram of the head and neck.    Normal perfusion images of the brain.    < end of copied text >      < from: CT Brain Stroke Protocol (08.13.21 @ 17:40) >  IMPRESSION:    No acute intracranial pathology.      Preliminary results were discussed with readback with Dr. Buck Chaparro at 5:45 PM on 8/13/2021.    --- End of Report ---    < end of copied text >          Assessment / Plan: This is a 46y year old Female presenting with left sided weakness and numbness s/p TPA in the ED.  Only new complaint is headache today but feels symptoms are better then yesterday  1. Repeat CTH this morning  2. If repeat CTH negative for hemorrhage then ok to give aspirin after 24 hours, can start statin 80mg qd  3. ECHO w/ Bubble  4. Hypercoagulable labs, LDL, hgba1c  5. MRI brain w/o DAVONTE  6. Follow post TPa protocol

## 2021-08-16 NOTE — DISCHARGE NOTE PROVIDER - NSDCMRMEDTOKEN_GEN_ALL_CORE_FT
Albuterol (Eqv-ProAir HFA) 90 mcg/inh inhalation aerosol: 2 puff(s) inhaled every 4 hours, As Needed -for bronchospasm   aspirin 81 mg oral tablet, chewable: 1 tab(s) orally once a day  atorvastatin 80 mg oral tablet: 1 tab(s) orally once a day (at bedtime)  FLUoxetine:   KlonoPIN:   midodrine 5 mg oral tablet: 1 tab(s) orally every 8 hours ( hold for SBP&gt;130)  Spiriva HandiHaler 18 mcg inhalation capsule: 1 cap(s) inhaled once a day   Symbicort 160 mcg-4.5 mcg/inh inhalation aerosol: 2 puff(s) inhaled 2 times a day

## 2021-08-16 NOTE — CONSULT NOTE ADULT - ASSESSMENT
IMPRESSION:  Stroke ?? s/p TPA  hypotensive     PLAN:    CNS: neuro consult   neuro Q 1hr   MRI   ct scan 24 hrs after tpa     HEENT: oral care     PULMONARY: keep pox > 92 %   do cxr     CARDIOVASCULAR: need echo   bolus 500cc LR now   on levo target keep SBP around 130-140 follow neurology     GI: GI prophylaxis. speech and swallow eval     RENAL:follow lytes   IS and os     INFECTIOUS DISEASE:no abx     HEMATOLOGICAL:  DVT prophylaxis. start heparin SQ and asa after 24 hrs from tpa     ENDOCRINE:  Follow up FS.  Insulin protocol if needed.    MUSCULOSKELETAL:        CRITICAL CARE TIME SPENT: ***
IMPRESSION: Rehab of 47 y/o  f  rehab  for  r/o  TIA    PRECAUTIONS: [  ] Cardiac  [  ] Respiratory  [  ] Seizures [  ] Contact Isolation  [  ] Droplet Isolation  [ FALL ] Other    Weight Bearing Status:     RECOMMENDATION:    Out of Bed to Chair     DVT/Decubiti Prophylaxis    REHAB PLAN:     [  x ] Bedside P/T 3-5 times a week   [  x ]   Bedside O/T  2-3 times a week             [   ] No Rehab Therapy Indicated                   [   ]  Speech Therapy   Conditioning/ROM                                    ADL  Bed Mobility                                               Conditioning/ROM  Transfers                                                     Bed Mobility  Sitting /Standing Balance                         Transfers                                        Gait Training                                               Sitting/Standing Balance  Stair Training [   ]Applicable                    Home equipment Eval                                                                        Splinting  [   ] Only      GOALS:   ADL   [   x]   Independent                    Transfers  [x   ] Independent                          Ambulation  [   x] Independent     [ x   ] With device                            [   ]  CG                                                         [   ]  CG                                                                  [   ] CG                            [    ] Min A                                                   [   ] Min A                                                              [   ] Min  A          DISCHARGE PLAN:   [   ]  Good candidate for Intensive Rehabilitation/Hospital based-[facility]A [facility]                                             Will tolerate 3hrs Intensive Rehab Daily                                       [    ]  Short Term Rehab in Skilled Nursing Facility                                       [ xx   ]  Home with Outpatient or VN services                                         [    ]  Possible Candidate for Intensive Hospital based Rehab

## 2021-08-16 NOTE — DISCHARGE NOTE PROVIDER - CARE PROVIDERS DIRECT ADDRESSES
[email or web address]

## 2021-08-16 NOTE — PROGRESS NOTE ADULT - ASSESSMENT
46y year old Female with a past medical history of asthma, anxiety and depression presented with left sided weakness and numbness s/p TPA in the ED.       A&P    # acute CVA w/ left sided weakness, right facial droop,  s/p TPA / hypotension     - symptoms have resolved   - this am pt was on very low dose levophed   - levophed was dc'd and pt ambulated in unit without difficulty and repeat BP was systolic 130   - pt was advised to purchase a home BP monitor and to keep a diary to show to outpt physisian   - midodrine 5mg q8h given until outpt f/u   - I discussed case with neurology attending and pt can be dc'd on aspirin and Lipitor with stroke clinic f/u    - continue home meds   - 45min spent on DC

## 2021-08-16 NOTE — DISCHARGE NOTE NURSING/CASE MANAGEMENT/SOCIAL WORK - PATIENT PORTAL LINK FT
You can access the FollowMyHealth Patient Portal offered by [facility] by registering at the following website: [email or web address]. By joining [facility]’s FollowMyHealth portal, you will also be able to view your health information using other applications (apps) compatible with our system.

## 2021-08-16 NOTE — DISCHARGE NOTE PROVIDER - NSDCCPCAREPLAN_GEN_ALL_CORE_FT
PRINCIPAL DISCHARGE DIAGNOSIS  Diagnosis: Stroke  Assessment and Plan of Treatment: Your symptoms are likely due to acute stroke. You had TPA given. Repeat CT of head didn't show bleeding. You were monitored in ICU. Please follow up with stroke clinic outpatient. Please follow up with your primary care provider within 1 week to get referral for Sleep study to rule out TALI.       PRINCIPAL DISCHARGE DIAGNOSIS  Diagnosis: Stroke  Assessment and Plan of Treatment: Your symptoms are likely due to acute stroke. You had TPA given. Repeat CT of head didn't show bleeding. You were monitored in ICU. Please follow up with stroke clinic outpatient. Please follow up with your primary care provider within 1 week to get referral for Sleep study to rule out TALI.      SECONDARY DISCHARGE DIAGNOSES  Diagnosis: Low BP  Assessment and Plan of Treatment: Your blood pressure was low and you were started on pressor support. you are to take midodrine 5mg orally 3times per day. please do not take this medicine if your BP is >140. Monitor your BP at home.

## 2021-08-16 NOTE — DISCHARGE NOTE PROVIDER - CARE PROVIDER_API CALL
Ronn Russell)  EEGEpilepsy; Neurology  [address] [facility], [address]  [place] [address]  Phone: [phone]  Fax: [phone]  Follow Up Time: 2 weeks    BRENDEN JUAN  [id number] [place] [address]  Phone: [phone]  Fax: ()-  Follow Up Time: 1 week

## 2021-08-16 NOTE — SWALLOW BEDSIDE ASSESSMENT ADULT - SLP PERTINENT HISTORY OF CURRENT PROBLEM
Pt admitted with L sided weakness, R facial droop. Pt received TPA in ED. MRI: questionable punctate acute R parietal infarct. Pt reports symptoms have now resolved

## 2021-08-17 LAB
APCR PPP: 2.93 RATIO — SIGNIFICANT CHANGE UP
B2 GLYCOPROT1 AB SER QL: NEGATIVE — SIGNIFICANT CHANGE UP
CARDIOLIPIN AB SER-ACNC: NEGATIVE — SIGNIFICANT CHANGE UP
PROT S FREE PPP-ACNC: 53 % — LOW (ref 63–140)

## 2021-08-19 PROBLEM — Z72.0 TOBACCO USE: Chronic | Status: ACTIVE | Noted: 2021-08-13

## 2021-08-19 LAB
DRVVT SCREEN TO CONFIRM RATIO: SIGNIFICANT CHANGE UP
LA NT DPL PPP QL: SIGNIFICANT CHANGE UP
PROT C ACT/NOR PPP: 92 % — SIGNIFICANT CHANGE UP (ref 65–129)
PTR INTERPRETATION: SIGNIFICANT CHANGE UP

## 2021-08-20 DIAGNOSIS — R29.704 NIHSS SCORE 4: ICD-10-CM

## 2021-08-20 DIAGNOSIS — I95.9 HYPOTENSION, UNSPECIFIED: ICD-10-CM

## 2021-08-20 DIAGNOSIS — R47.1 DYSARTHRIA AND ANARTHRIA: ICD-10-CM

## 2021-08-20 DIAGNOSIS — F41.9 ANXIETY DISORDER, UNSPECIFIED: ICD-10-CM

## 2021-08-20 DIAGNOSIS — G81.94 HEMIPLEGIA, UNSPECIFIED AFFECTING LEFT NONDOMINANT SIDE: ICD-10-CM

## 2021-08-20 DIAGNOSIS — F32.9 MAJOR DEPRESSIVE DISORDER, SINGLE EPISODE, UNSPECIFIED: ICD-10-CM

## 2021-08-20 DIAGNOSIS — J45.909 UNSPECIFIED ASTHMA, UNCOMPLICATED: ICD-10-CM

## 2021-08-20 DIAGNOSIS — R29.810 FACIAL WEAKNESS: ICD-10-CM

## 2021-08-20 DIAGNOSIS — I63.89 OTHER CEREBRAL INFARCTION: ICD-10-CM

## 2021-08-20 DIAGNOSIS — F17.290 NICOTINE DEPENDENCE, OTHER TOBACCO PRODUCT, UNCOMPLICATED: ICD-10-CM

## 2021-08-20 DIAGNOSIS — R51.9 HEADACHE, UNSPECIFIED: ICD-10-CM

## 2021-08-20 DIAGNOSIS — I63.9 CEREBRAL INFARCTION, UNSPECIFIED: ICD-10-CM

## 2021-08-23 LAB
CORTICOSTEROID BINDING GLOBULIN RESULT: 1.8 MG/DL — SIGNIFICANT CHANGE UP
CORTIS F/TOTAL MFR SERPL: 4.5 % — SIGNIFICANT CHANGE UP
CORTIS SERPL-MCNC: 4.9 UG/DL — SIGNIFICANT CHANGE UP
CORTISOL, FREE RESULT: 0.22 UG/DL — SIGNIFICANT CHANGE UP

## 2021-08-25 PROBLEM — Z00.00 ENCOUNTER FOR PREVENTIVE HEALTH EXAMINATION: Status: ACTIVE | Noted: 2021-08-25

## 2021-09-01 ENCOUNTER — APPOINTMENT (OUTPATIENT)
Dept: NEUROLOGY | Facility: CLINIC | Age: 46
End: 2021-09-01
Payer: MEDICAID

## 2021-09-01 VITALS
OXYGEN SATURATION: 98 % | HEIGHT: 62 IN | WEIGHT: 200 LBS | TEMPERATURE: 97.1 F | SYSTOLIC BLOOD PRESSURE: 106 MMHG | HEART RATE: 90 BPM | BODY MASS INDEX: 36.8 KG/M2 | DIASTOLIC BLOOD PRESSURE: 72 MMHG

## 2021-09-01 DIAGNOSIS — Z87.09 PERSONAL HISTORY OF OTHER DISEASES OF THE RESPIRATORY SYSTEM: ICD-10-CM

## 2021-09-01 DIAGNOSIS — Z86.59 PERSONAL HISTORY OF OTHER MENTAL AND BEHAVIORAL DISORDERS: ICD-10-CM

## 2021-09-01 PROCEDURE — 99215 OFFICE O/P EST HI 40 MIN: CPT

## 2021-09-01 RX ORDER — CLONAZEPAM 0.5 MG/1
0.5 TABLET ORAL
Refills: 0 | Status: ACTIVE | COMMUNITY

## 2021-09-01 RX ORDER — ASPIRIN 81 MG
81 TABLET, DELAYED RELEASE (ENTERIC COATED) ORAL
Refills: 0 | Status: ACTIVE | COMMUNITY

## 2021-09-01 RX ORDER — FLUVOXAMINE MALEATE 50 MG/1
50 TABLET ORAL
Refills: 0 | Status: ACTIVE | COMMUNITY

## 2021-09-01 NOTE — REASON FOR VISIT
[Follow-Up: _____] : a [unfilled] follow-up visit [Family Member] : family member [FreeTextEntry1] :  ?infarcts in R frontal lobe and right parietal lobe vs artifact or TIA.  \par

## 2021-09-01 NOTE — DISCUSSION/SUMMARY
[FreeTextEntry1] : Patient is a 45 yo RH woman with PMHx of Asthma, Anxiety/Depression who presents as HFU for ?infarcts in R frontal lobe and right parietal lobe vs artifact vs TIA. However, she does have significant FMHx including mother who  of stroke at 50, brother with stroke at age 50, sister with CAD. Interestingly, her LDL and A1c are wnl. Carotid artery duplex and CTA H/N are discordant. CTA H/N negative for stenosis but Duplex reporting 50-69% stenosis of the right ICA and <50% stenosis of the left ICA. Exam is significant for L facial, LUE/LLE drift and decreased sensation on the left side, NIHSS 4. \par \par PLAN: \par -Repeat MRI H w/wo contrast, will give Valium\par -Hypercoag and Rheum panel \par -Follow up with cardiology for Echo w/ bubble and 30 day event monitor \par -Decrease to Atorvastatin 40 \par -C/w  ASA 81  \par -Physical Therapy referral\par -Follow up in clinic in 3-4 months

## 2021-09-01 NOTE — HISTORY OF PRESENT ILLNESS
[FreeTextEntry1] : Patient is a 47 yo RH woman with PMHx of Asthma, Anxiety/Depression who presents as HFU for ?infarcts in R frontal lobe and right parietal lobe vs artifact or TIA.  \par \par She presented to the ED on 8/13/21 w/ c/o of dysarthria, LLE weakness and L facial.  NIHSS 4 in the ED and tPA was given. MRI H on 8/15/21 reports questionable punctate acute infarct within the right parietal lobe (versus artifact), series 3 image 26 and questionable focus of cortical/subcortical signal abnormality within the right frontal lobe (versus artifact), series 6,7 image 19 but motion-limited study. CTA H/N and CTP are negative. B/l Carotid duplex shows 50-69% stenosis of the right ICA and <50% stenosis of the left ICA. She was discharged on ASA 81 and Atorvastatin 80. LDL 77, A1c 5.4.  \par \par Today, patient presents to clinic with daughter. She c/o slight LUE and LLE weakness and numbness/tingling of Right of the head and LUE/LLE, sometimes whole body. She has Right sided HA at times. She has FMHx of mother who passed from stroke at age 50. Her brother at age 50 also had stroke, and she has sister with heart catheterization. She has 11 siblings. \par \par

## 2021-09-01 NOTE — PHYSICAL EXAM
[FreeTextEntry1] : Focal neurological exam:\par \par MS: Awake, alert, oriented to person, place, situation and time. Normal affect. Follows commands. \par \par Language: Speech is clear, fluent with good repetition & comprehension. No dysarthria. \par \par CNs 2 - 12 intact. EOMI no nystagmus, no diplopia. VFF. Left facial. Full eye closure strength b/l. Hearing grossly normal. Head turning & shoulder shrug intact b/l. Tongue midline, normal movements, no atrophy.\par \par Motor: Normal muscle bulk & tone. No noticeable tremor or seizure. Positive drift in LUE and LLE. Muscle strength of LUE 4+/5, LLE 4+/5, RUE/RLE 5/5.  \par \par Reflexes: DTR of biceps 1+, knees 1+  \par \par Sensation: Decreased to LT, temperature and vibration of LUE and LLE.\par \par Cortical: No extinction\par \par Coordination: No dysmetria to FTN.\par \par Gait: No postural instability. Normal stance and tandem gait.\par \par NIHSS: 4\par \par mRS 0\par \par \par \par \par

## 2021-09-10 RX ORDER — DIAZEPAM 5 MG/1
5 TABLET ORAL
Qty: 2 | Refills: 0 | Status: ACTIVE | COMMUNITY
Start: 2021-09-01 | End: 1900-01-01

## 2021-09-27 ENCOUNTER — OUTPATIENT (OUTPATIENT)
Dept: OUTPATIENT SERVICES | Facility: HOSPITAL | Age: 46
LOS: 1 days | Discharge: HOME | End: 2021-09-27
Payer: MEDICAID

## 2021-09-27 ENCOUNTER — RESULT REVIEW (OUTPATIENT)
Age: 46
End: 2021-09-27

## 2021-09-27 DIAGNOSIS — Z98.890 OTHER SPECIFIED POSTPROCEDURAL STATES: Chronic | ICD-10-CM

## 2021-09-27 DIAGNOSIS — I63.9 CEREBRAL INFARCTION, UNSPECIFIED: ICD-10-CM

## 2021-09-27 PROCEDURE — 70553 MRI BRAIN STEM W/O & W/DYE: CPT | Mod: 26

## 2021-09-28 ENCOUNTER — NON-APPOINTMENT (OUTPATIENT)
Age: 46
End: 2021-09-28

## 2021-12-29 ENCOUNTER — APPOINTMENT (OUTPATIENT)
Dept: NEUROLOGY | Facility: CLINIC | Age: 46
End: 2021-12-29
Payer: MEDICAID

## 2021-12-29 VITALS
WEIGHT: 210 LBS | DIASTOLIC BLOOD PRESSURE: 87 MMHG | BODY MASS INDEX: 38.64 KG/M2 | SYSTOLIC BLOOD PRESSURE: 118 MMHG | TEMPERATURE: 97.8 F | HEIGHT: 62 IN | OXYGEN SATURATION: 98 % | HEART RATE: 99 BPM

## 2021-12-29 DIAGNOSIS — G47.30 SLEEP APNEA, UNSPECIFIED: ICD-10-CM

## 2021-12-29 DIAGNOSIS — I63.9 CEREBRAL INFARCTION, UNSPECIFIED: ICD-10-CM

## 2021-12-29 DIAGNOSIS — Z82.0 FAMILY HISTORY OF EPILEPSY AND OTHER DISEASES OF THE NERVOUS SYSTEM: ICD-10-CM

## 2021-12-29 DIAGNOSIS — R20.1 HYPOESTHESIA OF SKIN: ICD-10-CM

## 2021-12-29 DIAGNOSIS — Z82.3 FAMILY HISTORY OF STROKE: ICD-10-CM

## 2021-12-29 PROCEDURE — 99214 OFFICE O/P EST MOD 30 MIN: CPT

## 2021-12-29 NOTE — PHYSICAL EXAM
[FreeTextEntry1] : Focal neurological exam:\par \par MS: Awake, alert, oriented to person, place, situation and time. Normal affect. Follows commands. \par \par Language: Speech is clear, fluent with good repetition & comprehension. No dysarthria. \par \par CNs 2 - 12 intact. EOMI no nystagmus, no diplopia. VFF. NO facial asymmetry. Full eye closure strength b/l. Hearing grossly normal. Head turning & shoulder shrug intact b/l. Tongue midline, normal movements, no atrophy.\par \par Motor: Normal muscle bulk & tone. No noticeable tremor or seizure. NO drift in all four extremities. Muscle strength of LUE 5/5, LLE 5-/5, RUE/RLE 5/5. \par \par Reflexes: DTR of biceps 2+, knees 2+ \par \par Sensation: Decreased to LT, temperature and vibration of LUE and LLE, decreased to LT of L face. \par \par Cortical: No extinction\par \par Coordination: No dysmetria to FTN.\par \par Gait: No postural instability. Normal stance and tandem gait.\par \par NIHSS: 1\par \par mRS 0\par

## 2021-12-29 NOTE — REASON FOR VISIT
[Follow-Up: _____] : a [unfilled] follow-up visit [Family Member] : family member [FreeTextEntry1] : for Left sided weakness and hypoesthesia, concerning for ?TIA

## 2021-12-29 NOTE — REVIEW OF SYSTEMS
[As Noted in HPI] : as noted in HPI [Anxiety] : anxiety [Depression] : depression [Negative] : Gastrointestinal

## 2021-12-29 NOTE — HISTORY OF PRESENT ILLNESS
[FreeTextEntry1] : Patient is a 47 yo RH woman with PMHx of Asthma, Anxiety/Depression who presents for follow up of episode of dysarthria, LLE weakness and L facial during ED visit on 2021. During the hospital, MRI H reported questionable infarcts but this was shown to be artifact given negative follow up MRI H w/wo DAVONTE outpatient. She does have significant FMHx of mother who  of stroke at 50, brother with stroke at age 50, sister with CAD. Interestingly, her LDL and A1c were wnl at time of episode. Carotid artery duplex and CTA H/N were discordant. CTA H/N negative for stenosis but Duplex reporting 50-69% stenosis of the right ICA and <50% stenosis of the left ICA. She was last seen in clinic in 2021 and NIHSS was 4 for L facial, LUE/LLE drift and decreased sensation on the left side.\par \par Today, patient presents to clinic with daughter who states that she continues to have LUE/LLE weakness when she gets emotionally upset. She had ILR placed one month ago as well as negative echo with bubble study per patient done at [facility]. She is awaiting results of Hypercoag/Rheum panel. She never got PT bc of insurance issues and now c/o she choking on saliva when she's upset or sometimes this wakes her up from sleep. She also revealed that she has two siblings with multiple sclerosis. \par

## 2021-12-29 NOTE — ASSESSMENT
[FreeTextEntry1] : Patient is a 47 yo RH woman with PMHx of Asthma, Anxiety/Depression who presents for follow up of episode of dysarthria, LLE weakness and L facial during ED visit on 2021. This may have been TIA given b/l carotid duplex showing stenosis of b/l ICAs, but her sx persisted. MRI H w/wo DAVONTE done outpatient was unremarkable. She does have significant FMHx of mother who  of stroke at 50, brother with stroke at age 50, sister with CAD. Interestingly, her LDL and A1c are wnl. Today, exam is significant for LLE mild weakness 5-/5 and hypoesthesia to entire Left side including face (although exam is improved from last visit). She does have hx of back sx for herniated discs done at [facility] in 2016. Her sx may be related to her lower back or her anxiety/depression (which daughter said initial presentation was associated with emotional upset). She is getting injections in her ankles for inflammation. \par \par PLAN: \par -EMG/NCS of LUE and LLE \par -MRI Lumbar spine, if not approved, will do X-ray \par -Referral to pulmonary medicine for TALI work up as STOP BANG 4\par -C/w ASA 81 and Atorvastatin 40\par -Follow up in clinic in 3-4 months with Dr. Norwood to look for etiology of sx \par

## 2022-04-07 ENCOUNTER — OUTPATIENT (OUTPATIENT)
Dept: OUTPATIENT SERVICES | Facility: HOSPITAL | Age: 47
LOS: 1 days | Discharge: HOME | End: 2022-04-07

## 2022-04-07 DIAGNOSIS — R20.1 HYPOESTHESIA OF SKIN: ICD-10-CM

## 2022-04-07 DIAGNOSIS — R53.1 WEAKNESS: ICD-10-CM

## 2022-04-07 DIAGNOSIS — Z98.890 OTHER SPECIFIED POSTPROCEDURAL STATES: Chronic | ICD-10-CM

## 2022-04-08 RX ORDER — DIAZEPAM 5 MG/1
5 TABLET ORAL
Qty: 2 | Refills: 0 | Status: ACTIVE | COMMUNITY
Start: 2022-04-08 | End: 1900-01-01

## 2022-04-08 NOTE — DISCHARGE NOTE NURSING/CASE MANAGEMENT/SOCIAL WORK - NSDCPEPTCAREGIVEDUMATLIST _GEN_ALL_CORE
Patient here for medication check. Medication Reconciliation: complete.    Lakesha Donohue LPN  4/8/2022 1:53 PM   Stroke

## 2022-04-14 ENCOUNTER — APPOINTMENT (OUTPATIENT)
Dept: NEUROLOGY | Facility: CLINIC | Age: 47
End: 2022-04-14

## 2022-04-30 DIAGNOSIS — R53.1 WEAKNESS: ICD-10-CM

## 2022-06-15 ENCOUNTER — NON-APPOINTMENT (OUTPATIENT)
Age: 47
End: 2022-06-15

## 2022-06-15 ENCOUNTER — APPOINTMENT (OUTPATIENT)
Dept: NEUROLOGY | Facility: CLINIC | Age: 47
End: 2022-06-15

## 2022-07-29 NOTE — H&P ADULT - NSHPRISKPAPSMEAROFFER_GEN_A_CORE
7/29 The Plan for Transition of Care is related to the following treatment goals: Strengthening    The Patient  was provided with a choice of provider and agrees   with the discharge plan. [x] Yes [] No    Freedom of choice list was provided with basic dialogue that supports the patient's individualized plan of care/goals, treatment preferences and shares the quality data associated with the providers. [x] Yes [] No     Referral sent to [facility] per patient choice.  Electronically signed by Umberto Rodrigues RN on 7/29/2022 at 4:24 PM Offered and patient declined

## 2023-05-25 NOTE — ED ADULT NURSE NOTE - NS ED PATIENT SAFETY CONCERN
Requested rx is sent  
Will you send in alternative?  Pharmacy comment: Alternative Requested:COPAY IS $615/MONTH FOR THIS MED. INSULINE LISPRO KWIKPE IS ZERO COPAY. PLEASE SEND ALTERNATIVE. THANK YOU.  
No

## 2023-12-29 NOTE — ED ADULT NURSE NOTE - NS PRO PASSIVE SMOKE EXP
Remote Patient Monitoring Note      Date/Time:  2023 1:10 PM  Patient Current Location: [address] [place] [address] contacted patient by telephone regarding red alert received for weight increase (3 lbs in a day). Verified patients name and  as identifiers. Background: Enrolled in [employer] for HTN  Clinical Interventions: Reviewed and followed up on alerts and treatments-Pt states she weighed herself after eating breakfast this morning. Reviewed how to weigh accurately every morning. States she does have a little bit of LE swelling and can see her sock line indentation. Denies SOB or CP. She is planning to take her PRN lasix today. Pt also c/o constipation. Last BM . Reports she's been having heartburn in the middle of the night the past few nights relieved with tums and sitting up. She has miralax at home and will take that today for the constipation. Discussed increasing fiber intake and avoiding foods that trigger reflux/heartburn. Denies n/v, abdominal pain, or decreased appetite. ACC updated. Plan/Follow Up: Will continue to review, monitor and address alerts with follow up based on severity of symptoms and risk factors.
Unknown

## 2024-01-10 ENCOUNTER — EMERGENCY (EMERGENCY)
Facility: HOSPITAL | Age: 49
LOS: 0 days | Discharge: HOME | End: 2024-01-10
Attending: EMERGENCY MEDICINE
Payer: MEDICAID

## 2024-01-10 VITALS
HEART RATE: 97 BPM | OXYGEN SATURATION: 98 % | RESPIRATION RATE: 17 BRPM | SYSTOLIC BLOOD PRESSURE: 136 MMHG | TEMPERATURE: 98 F | DIASTOLIC BLOOD PRESSURE: 69 MMHG | WEIGHT: 199.96 LBS

## 2024-01-10 DIAGNOSIS — S90.01XA CONTUSION OF RIGHT ANKLE, INITIAL ENCOUNTER: ICD-10-CM
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
## 2024-01-10 DIAGNOSIS — M25.571 PAIN IN RIGHT ANKLE AND JOINTS OF RIGHT FOOT: ICD-10-CM

## 2024-01-10 DIAGNOSIS — Z98.890 OTHER SPECIFIED POSTPROCEDURAL STATES: Chronic | ICD-10-CM

## 2024-01-10 PROCEDURE — 99283 EMERGENCY DEPT VISIT LOW MDM: CPT | Mod: 25

## 2024-01-10 PROCEDURE — 73610 X-RAY EXAM OF ANKLE: CPT | Mod: 26,RT

## 2024-01-10 PROCEDURE — 29515 APPLICATION SHORT LEG SPLINT: CPT | Mod: RT

## 2024-01-10 PROCEDURE — 99284 EMERGENCY DEPT VISIT MOD MDM: CPT | Mod: 25

## 2024-01-10 PROCEDURE — 73610 X-RAY EXAM OF ANKLE: CPT | Mod: RT

## 2024-01-10 RX ORDER — IBUPROFEN 200 MG
600 TABLET ORAL ONCE
Refills: 0 | Status: COMPLETED | OUTPATIENT
Start: 2024-01-10 | End: 2024-01-10

## 2024-01-10 RX ADMIN — Medication 600 MILLIGRAM(S): at 20:27

## 2024-01-10 NOTE — ED PROVIDER NOTE - NSFOLLOWUPINSTRUCTIONS_ED_ALL_ED_FT
Follow up with your primary medical doctor in 1-2 days as well as a referral was placed in your name for orthopedist    Ankle Pain    Many things can cause ankle pain, including an injury to the area and overuse of the ankle. The ankle joint holds your body weight and allows you to move around. Ankle pain can occur on either side or the back of one ankle or both ankles. Ankle pain may be sharp and burning or dull and aching. There may be tenderness, stiffness, redness, or warmth around the ankle.     HOME CARE INSTRUCTIONS  Activity    Rest your ankle as told by your health care provider. Avoid any activities that cause ankle pain.  Do exercises as told by your health care provider.  Ask your health care provider if you can drive.    Using a Brace, a Bandage, or Crutches    If you were given a brace:  Wear it as told by your health care provider.  Remove it when you take a bath or a shower.  Try not to move your ankle very much, but wiggle your toes from time to time. This helps to prevent swelling.  If you were given an elastic bandage:  Remove it when you take a bath or a shower.  Try not to move your ankle very much, but wiggle your toes from time to time. This helps to prevent swelling.  Adjust the bandage to make it more comfortable if it feels too tight.  Loosen the bandage if you have numbness or tingling in your foot or if your foot turns cold and blue.  If you have crutches, use them as told by your health care provider. Continue to use them until you can walk without feeling pain in your ankle.    Managing Pain, Stiffness, and Swelling    Raise (elevate) your ankle above the level of your heart while you are sitting or lying down.  If directed, apply ice to the area:  Put ice in a plastic bag.  Place a towel between your skin and the bag.  Leave the ice on for 20 minutes, 2–3 times per day.    General Instructions    Keep all follow-up visits as told by your health care provider. This is important.  Record this information that may be helpful for you and your health care provider:  How often you have ankle pain.  Where the pain is located.  What the pain feels like.  Take over-the-counter and prescription medicines only as told by your health care provider.    SEEK MEDICAL CARE IF:  Your pain gets worse.  Your pain is not relieved with medicines.  You have a fever or chills.  You are having more trouble with walking.  You have new symptoms.    SEEK IMMEDIATE MEDICAL CARE IF:  Your foot, leg, toes, or ankle tingles or becomes numb.  Your foot, leg, toes, or ankle becomes swollen.  Your foot, leg, toes, or ankle turns pale or blue.    ADDITIONAL NOTES AND INSTRUCTIONS    Please follow up with your Primary MD in 24-48 hr.  Seek immediate medical care for any new/worsening signs or symptoms.

## 2024-01-10 NOTE — ED ADULT TRIAGE NOTE - CHIEF COMPLAINT QUOTE
Pt here s/p fall 2 hours ago c/o R ankle pain Pt here s/p trip and fall 2 hours ago c/o R ankle pain. took tyenol PTA. no HT.

## 2024-01-10 NOTE — ED PROVIDER NOTE - PHYSICAL EXAMINATION
Physical Exam    Vital Signs: I have reviewed the initial vital signs.  Constitutional: well-nourished, appears stated age, no acute distress  Eyes: Conjunctiva pink, Sclera clear, PERRLA, EOMI.  Cardiovascular: S1 and S2, regular rate, regular rhythm, well-perfused extremities, radial pulses equal and 2+  Respiratory: unlabored respiratory effort, clear to auscultation bilaterally no wheezing, rales and rhonchi  Gastrointestinal: soft, non-tender abdomen, no pulsatile mass, normal bowl sounds  Musculoskeletal: supple neck, no lower extremity edema, no midline tenderness. TTP of the R ankle over the lateral malleolus with swelling noted and bruising. no crepitus, deformity noted. no pain over foot or proximal to injury. no sensory def and pulses intact   Integumentary: warm, dry, no rash  Neurologic: awake, alert, cranial nerves II-XII grossly intact, extremities’ motor and sensory functions grossly intact  Psychiatric: appropriate mood, appropriate affect

## 2024-01-10 NOTE — ED PROVIDER NOTE - CLINICAL SUMMARY MEDICAL DECISION MAKING FREE TEXT BOX
No distress.  NV intact.  XRay negative.  Splint placed for comfort.  Crutches given.  DC home. Strict return instructions discussed.  Supportive cared discussed.

## 2024-01-10 NOTE — ED PROCEDURE NOTE - CPROC ED TIME OUT STATEMENT1
“Patient's name, , procedure and correct site were confirmed during the [place] Timeout.” “Patient's name, , procedure and correct site were confirmed during the [place] Timeout.”

## 2024-01-10 NOTE — ED PROVIDER NOTE - NSICDXPASTMEDICALHX_GEN_ALL_CORE_FT
PAST MEDICAL HISTORY:  Anxiety and depression     Asthma     Vapes nicotine containing substance

## 2024-01-10 NOTE — ED PROVIDER NOTE - OBJECTIVE STATEMENT
Pt is a 48 female with PMH noted in chart presents to ED with complaints fo R ankle pain. Pt states was walking in house when she rolled her R ankle causing her to fall onto floor onto affected ankle. Pt denies any head trauma, neck or back pain. pt states pain is moderate, non radiating better with rest and worse with walking. Denies any other injuries or comlpaints

## 2024-01-10 NOTE — ED PROVIDER NOTE - PATIENT PORTAL LINK FT
You can access the FollowMyHealth Patient Portal offered by [facility] by registering at the following website: [email or web address]. By joining [employer]’s FollowMyHealth portal, you will also be able to view your health information using other applications (apps) compatible with our system. You can access the FollowMyHealth Patient Portal offered by [facility] by registering at the following website: [email or web address]. By joining [employer]’s FollowMyHealth portal, you will also be able to view your health information using other applications (apps) compatible with our system.

## 2024-02-16 ENCOUNTER — EMERGENCY (EMERGENCY)
Facility: HOSPITAL | Age: 49
LOS: 0 days | Discharge: HOME | End: 2024-02-16
Attending: EMERGENCY MEDICINE
Payer: COMMERCIAL

## 2024-02-16 VITALS
SYSTOLIC BLOOD PRESSURE: 117 MMHG | RESPIRATION RATE: 18 BRPM | DIASTOLIC BLOOD PRESSURE: 74 MMHG | OXYGEN SATURATION: 99 % | HEART RATE: 75 BPM

## 2024-02-16 VITALS
OXYGEN SATURATION: 99 % | RESPIRATION RATE: 19 BRPM | SYSTOLIC BLOOD PRESSURE: 109 MMHG | TEMPERATURE: 97 F | HEART RATE: 67 BPM | DIASTOLIC BLOOD PRESSURE: 70 MMHG
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
## 2024-02-16 DIAGNOSIS — S82.62XA DISPLACED FRACTURE OF LATERAL MALLEOLUS OF LEFT FIBULA, INITIAL ENCOUNTER FOR CLOSED FRACTURE: ICD-10-CM

## 2024-02-16 DIAGNOSIS — Z98.890 OTHER SPECIFIED POSTPROCEDURAL STATES: Chronic | ICD-10-CM

## 2024-02-16 DIAGNOSIS — M25.572 PAIN IN LEFT ANKLE AND JOINTS OF LEFT FOOT: ICD-10-CM
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
## 2024-02-16 DIAGNOSIS — S82.52XA DISPLACED FRACTURE OF MEDIAL MALLEOLUS OF LEFT TIBIA, INITIAL ENCOUNTER FOR CLOSED FRACTURE: ICD-10-CM

## 2024-02-16 PROCEDURE — 73630 X-RAY EXAM OF FOOT: CPT | Mod: 26,LT

## 2024-02-16 PROCEDURE — 99284 EMERGENCY DEPT VISIT MOD MDM: CPT | Mod: 57

## 2024-02-16 PROCEDURE — 73630 X-RAY EXAM OF FOOT: CPT | Mod: LT

## 2024-02-16 PROCEDURE — 29515 APPLICATION SHORT LEG SPLINT: CPT | Mod: LT

## 2024-02-16 PROCEDURE — 73610 X-RAY EXAM OF ANKLE: CPT | Mod: LT

## 2024-02-16 PROCEDURE — 73610 X-RAY EXAM OF ANKLE: CPT | Mod: 26,LT

## 2024-02-16 PROCEDURE — 27808 TREATMENT OF ANKLE FRACTURE: CPT | Mod: 54,LT

## 2024-02-16 PROCEDURE — 99284 EMERGENCY DEPT VISIT MOD MDM: CPT | Mod: 25

## 2024-02-16 RX ORDER — ACETAMINOPHEN 500 MG
975 TABLET ORAL ONCE
Refills: 0 | Status: COMPLETED | OUTPATIENT
Start: 2024-02-16 | End: 2024-02-16

## 2024-02-16 RX ORDER — IBUPROFEN 200 MG
600 TABLET ORAL ONCE
Refills: 0 | Status: COMPLETED | OUTPATIENT
Start: 2024-02-16 | End: 2024-02-16

## 2024-02-16 RX ORDER — OXYCODONE AND ACETAMINOPHEN 5; 325 MG/1; MG/1
1 TABLET ORAL
Qty: 12 | Refills: 0
Start: 2024-02-16

## 2024-02-16 RX ORDER — LIDOCAINE HCL 20 MG/ML
30 VIAL (ML) INJECTION ONCE
Refills: 0 | Status: COMPLETED | OUTPATIENT
Start: 2024-02-16 | End: 2024-02-16

## 2024-02-16 RX ADMIN — Medication 975 MILLIGRAM(S): at 14:24

## 2024-02-16 RX ADMIN — Medication 30 MILLILITER(S): at 15:15

## 2024-02-16 RX ADMIN — Medication 600 MILLIGRAM(S): at 14:24

## 2024-02-16 NOTE — ED PROVIDER NOTE - PATIENT PORTAL LINK FT
You can access the FollowMyHealth Patient Portal offered by [facility] by registering at the following website: [email or web address]. By joining [employer]’s FollowMyHealth portal, you will also be able to view your health information using other applications (apps) compatible with our system.

## 2024-02-16 NOTE — ED PROVIDER NOTE - CARE PROVIDER_API CALL
Leonard Cid  Orthopaedic Surgery  [address] [place] [address]  Phone: [phone]  Fax: [phone]  Follow Up Time:

## 2024-02-16 NOTE — ED PROVIDER NOTE - CLINICAL SUMMARY MEDICAL DECISION MAKING FREE TEXT BOX
In my opinion, outpatient treatment and follow up are appropriate.  Case discussed with orthopedics while patient was in the ED.

## 2024-02-16 NOTE — ED PROVIDER NOTE - ATTENDING APP SHARED VISIT CONTRIBUTION OF CARE
.  Status post mechanical fall today.  Patient has swelling of the ankle.  Distal neurovascular function is intact.  Positive crepitus.  No range of motion possible because of pain.  There is no tenderness of the knee.  X-ray shows bimalleolar fracture.  Fracture splinted.  Patient advised to follow-up with orthopedics early next week, no weightbearing, strict return precautions discussed.

## 2024-02-16 NOTE — ED PROVIDER NOTE - NSFOLLOWUPINSTRUCTIONS_ED_ALL_ED_FT
Our Emergency Department Referral Coordinators will be reaching out to you in the next 24-48 hours from 9:00am to 5:00pm with a follow up appointment. Please expect a phone call from the hospital in that time frame. If you do not receive a call or if you have any questions or concerns, you can reach them at   [phone]

## 2024-02-16 NOTE — ED PROVIDER NOTE - OBJECTIVE STATEMENT
Patient is a 48-year-old female here for evaluation of left ankle pain status post twisting injury while stepping off the earlier today.  Patient denies knee pain, head trauma, weakness, numbness

## 2024-02-16 NOTE — ED PROVIDER NOTE - PHYSICAL EXAMINATION
VITAL SIGNS: I have reviewed nursing notes and confirm.  CONSTITUTIONAL: Well-developed; well-nourished  SKIN: skin exam is warm and dry, no acute rash.    HEAD: Normocephalic; atraumatic.  EYES: conjunctiva and sclera clear.  ENT: No nasal discharge; airway clear.  EXT: swelling and TTP to left ankle, pedal pulses present no tenderness to knee Normal ROM.  No clubbing, cyanosis or edema.   NEURO: Alert, oriented, grossly unremarkable

## 2024-02-16 NOTE — ED ADULT NURSE NOTE - CAS ELECT INFOMATION PROVIDED
Patient request daughter at bedside to act as , patient demonstrated correct use of crutch and walker and teaching./DC instructions

## 2024-02-18 ENCOUNTER — INPATIENT (INPATIENT)
Facility: HOSPITAL | Age: 49
LOS: 3 days | Discharge: HOME | DRG: 380 | End: 2024-02-22
Attending: STUDENT IN AN ORGANIZED HEALTH CARE EDUCATION/TRAINING PROGRAM | Admitting: INTERNAL MEDICINE
Payer: MEDICAID

## 2024-02-18 VITALS
TEMPERATURE: 98 F | HEART RATE: 77 BPM | WEIGHT: 190.04 LBS | RESPIRATION RATE: 18 BRPM | OXYGEN SATURATION: 97 % | SYSTOLIC BLOOD PRESSURE: 103 MMHG | DIASTOLIC BLOOD PRESSURE: 66 MMHG

## 2024-02-18 DIAGNOSIS — Z98.890 OTHER SPECIFIED POSTPROCEDURAL STATES: Chronic | ICD-10-CM

## 2024-02-18 PROCEDURE — 99285 EMERGENCY DEPT VISIT HI MDM: CPT | Mod: 25

## 2024-02-18 PROCEDURE — 29515 APPLICATION SHORT LEG SPLINT: CPT | Mod: LT

## 2024-02-18 NOTE — ED ADULT NURSE NOTE - NSFALLUNIVINTERV_ED_ALL_ED
Bed/Stretcher in lowest position, wheels locked, appropriate side rails in place/Call bell, personal items and telephone in reach/Instruct patient to call for assistance before getting out of bed/chair/stretcher/Non-slip footwear applied when patient is off stretcher/[place] to call system/Physically safe environment - no spills, clutter or unnecessary equipment/Purposeful proactive rounding/Room/bathroom lighting operational, light cord in reach

## 2024-02-18 NOTE — ED ADULT TRIAGE NOTE - CHIEF COMPLAINT QUOTE
left ankle fracture, splint placed in ed, pt complaining of swelling and numbness in the toes and foot

## 2024-02-18 NOTE — H&P ADULT - NSHPLABSRESULTS_GEN_ALL_CORE
Lactate Trend        CAPILLARY BLOOD GLUCOSE 13.3   10.08 )-----------( 329      ( 19 Feb 2024 00:27 )             39.1     02-19    134<L>  |  97<L>  |  12  ----------------------------<  112<H>  5.2<H>   |  28  |  0.7    Ca    9.7      19 Feb 2024 00:27    TPro  7.5  /  Alb  4.3  /  TBili  0.2  /  DBili  x   /  AST  16  /  ALT  17  /  AlkPhos  110  02-19          Urinalysis Basic - ( 19 Feb 2024 00:27 )    Color: x / Appearance: x / SG: x / pH: x  Gluc: 112 mg/dL / Ketone: x  / Bili: x / Urobili: x   Blood: x / Protein: x / Nitrite: x   Leuk Esterase: x / RBC: x / WBC x   Sq Epi: x / Non Sq Epi: x / Bacteria: x        Lactate Trend        CAPILLARY BLOOD GLUCOSE

## 2024-02-18 NOTE — ED PROVIDER NOTE - PHYSICAL EXAMINATION
Vital Signs: I have reviewed the initial vital signs.  Constitutional: appears stated age, no acute distress  Eyes: Sclera clear, EOMI.  Cardiovascular: S1 and S2, regular rate, regular rhythm, well-perfused extremities, radial pulses equal and 2+, pedal pulses 2+ and equal  Respiratory: unlabored respiratory effort, clear to auscultation bilaterally no wheezing, rales, or rhonchi  Gastrointestinal:  abdomen soft, non-tender  Musculoskeletal: supple neck, no lower extremity edema  Integumentary: warm, dry, + blisters to distal left lower leg anteromedial aspect  Neurologic: awake, alert, oriented x3, extremities’ motor and sensory functions grossly intact

## 2024-02-18 NOTE — ED PROVIDER NOTE - CARE PLAN
Principal Discharge DX:	Ambulatory dysfunction  Secondary Diagnosis:	Blister of skin due to prolonged pressure  Secondary Diagnosis:	Ankle fracture   1

## 2024-02-18 NOTE — ED PROVIDER NOTE - ATTENDING APP SHARED VISIT CONTRIBUTION OF CARE
48-year-old female presents for evaluation of left ankle plain and tingling to her toes for the last 2 days, getting worse.  Patient was seen in the ED 2 days ago status post fall and found to have left-sided bimalleolar fracture.  Patient states she is having difficult time at home as bedroom is on second floor and she is having difficulty navigating stairs.  Daughter states patient is also having difficulty getting to the bathroom secondary to immobilization and pain.  On exam patient in NAD, AAOx3, splint is removed to reveal blistering of the skin to medial aspect of left ankle, positive DP pulses and brisk cap refill, positive swelling, no calf tenderness, compartments are soft

## 2024-02-18 NOTE — ED PROVIDER NOTE - OBJECTIVE STATEMENT
48-year-old female presents with complaint of left ankle pain.  Patient had splint placed for left ankle fracture on 2/16 and reports since then she has had tenderness to the left lower ankle.  Also reports feeling paresthesias to left distal toes.  Patient reports that her bedroom is on the second floor of her home and she has had to utilize the stairs in order to get up to bed.  States it has been increasingly difficult for her to get to the restroom at times she feels she does not have enough time to get to the restroom to urinate due to the immobilization of the left lower extremity.  Denies other injury/trauma, focal weakness/numbness, lightheadedness, dizziness.

## 2024-02-18 NOTE — H&P ADULT - NSHPREVIEWOFSYSTEMS_GEN_ALL_CORE
Has prior admission for concern for CVA Has prior admission  for CVA (received Has prior admission  for CVA (received TPA with improvement of symptoms) - 2021

## 2024-02-18 NOTE — H&P ADULT - HISTORY OF PRESENT ILLNESS
(Used both ER records and for further details,  phone [phone] though patient speaks some English)  (Used both ER records and for further details,  phone [phone] though patient speaks some English) 49yo female who has history of asthma and who was recently in our ER due to left ankle fracture after a fall (Feb 16 - splint applied), presents to the ER today due to tenderness to area along with paresthesia to distal toes/ She also reports difficulty with ambulation at home. When splint was removed, blisters to the skin were noted. A new splint was then applied to left lower leg  (Used both ER records and for further details,  phone [phone] though patient speaks some English) 47yo female who has history of asthma, cerebral infarct, anxiety with depression, and who was recently in our ER due to left ankle fracture after a fall (Feb 16 - splint applied), presents to the ER today due to tenderness to area along with paresthesia to distal toes/ She also reports difficulty with ambulation at home. When splint was removed, blisters to the skin were noted. A new splint was then applied to left lower leg  (Used both ER records and for further details,  phone [phone] though patient speaks some English) 47yo female who has history of asthma, acute right parietal infarct (Aug 2021), anxiety with depression, and who was recently in our ER due to left ankle fracture after a fall (Feb 16 - splint applied), presents to the ER today due to tenderness to area along with paresthesia to distal toes/ She also reports difficulty with ambulation at home. When splint was removed, blisters to the skin were noted. A new splint was then applied to left lower leg

## 2024-02-18 NOTE — ED PROVIDER NOTE - CLINICAL SUMMARY MEDICAL DECISION MAKING FREE TEXT BOX
Attempted to contact orthopedics without success.  Given patient's ambulation difficulty secondary to  fracture patient will benefit from admission at this time.

## 2024-02-18 NOTE — H&P ADULT - ASSESSMENT
ankle    Pressure blisters    DVT prophylaxis - despite computer assessment, may benefit from it ambulatory dysfunction related to recent left ankle fracture    Pressure blisters    DVT prophylaxis - despite computer assessment, may benefit from it ambulatory dysfunction related to recent left ankle fracture    Pressure blisters    DVT prophylaxis - despite computer assessment, may benefit from it    Medication management - Patient cannot confirm all medications or any doses but she will ask family to bring in her medications in the daytime  ambulatory dysfunction related to recent left ankle fracture - analgesics prn, rehab and orthopedist consults    Pressure blisters to leg - monitor     DVT prophylaxis - despite computer assessment, may benefit from it due to immobility (ordered)     Medication management - Patient cannot confirm all medications or any doses but she will ask family to bring in her medications in the daytime  ambulatory dysfunction related to recent left ankle fracture - analgesics prn, rehab and orthopedist consults    Pressure blisters to leg - monitor     history of CVA, asthma, anxiety, depression - noted, meds to be clarified     DVT prophylaxis - despite computer assessment, may benefit from it due to immobility (ordered)     Medication management - Patient cannot confirm all medications or any doses but she will ask family to bring in her medications in the daytime

## 2024-02-18 NOTE — H&P ADULT - SKIN COMMENTS
Per ER - blisters to distal lower leg anteromedial aspect Per ER - blisters to distal lower leg anteromedial aspect (Currently area is obscured

## 2024-02-19 DIAGNOSIS — R26.2 DIFFICULTY IN WALKING, NOT ELSEWHERE CLASSIFIED: ICD-10-CM

## 2024-02-19 LAB
ALBUMIN SERPL ELPH-MCNC: 4.3 G/DL — SIGNIFICANT CHANGE UP (ref 3.5–5.2)
ALP SERPL-CCNC: 110 U/L — SIGNIFICANT CHANGE UP (ref 30–115)
ALT FLD-CCNC: 17 U/L — SIGNIFICANT CHANGE UP (ref 0–41)
ANION GAP SERPL CALC-SCNC: 11 MMOL/L — SIGNIFICANT CHANGE UP (ref 7–14)
ANION GAP SERPL CALC-SCNC: 9 MMOL/L — SIGNIFICANT CHANGE UP (ref 7–14)
APTT BLD: 37.6 SEC — SIGNIFICANT CHANGE UP (ref 27–39.2)
AST SERPL-CCNC: 16 U/L — SIGNIFICANT CHANGE UP (ref 0–41)
BASOPHILS # BLD AUTO: 0.05 K/UL — SIGNIFICANT CHANGE UP (ref 0–0.2)
BASOPHILS NFR BLD AUTO: 0.5 % — SIGNIFICANT CHANGE UP (ref 0–1)
BILIRUB SERPL-MCNC: 0.2 MG/DL — SIGNIFICANT CHANGE UP (ref 0.2–1.2)
BUN SERPL-MCNC: 11 MG/DL — SIGNIFICANT CHANGE UP (ref 10–20)
BUN SERPL-MCNC: 12 MG/DL — SIGNIFICANT CHANGE UP (ref 10–20)
CALCIUM SERPL-MCNC: 9.2 MG/DL — SIGNIFICANT CHANGE UP (ref 8.4–10.5)
CALCIUM SERPL-MCNC: 9.7 MG/DL — SIGNIFICANT CHANGE UP (ref 8.4–10.5)
CHLORIDE SERPL-SCNC: 103 MMOL/L — SIGNIFICANT CHANGE UP (ref 98–110)
CHLORIDE SERPL-SCNC: 97 MMOL/L — LOW (ref 98–110)
CO2 SERPL-SCNC: 26 MMOL/L — SIGNIFICANT CHANGE UP (ref 17–32)
CO2 SERPL-SCNC: 28 MMOL/L — SIGNIFICANT CHANGE UP (ref 17–32)
CREAT SERPL-MCNC: 0.6 MG/DL — LOW (ref 0.7–1.5)
CREAT SERPL-MCNC: 0.7 MG/DL — SIGNIFICANT CHANGE UP (ref 0.7–1.5)
EGFR: 105 ML/MIN/1.73M2 — SIGNIFICANT CHANGE UP
EGFR: 109 ML/MIN/1.73M2 — SIGNIFICANT CHANGE UP
EOSINOPHIL # BLD AUTO: 0.09 K/UL — SIGNIFICANT CHANGE UP (ref 0–0.7)
EOSINOPHIL NFR BLD AUTO: 0.9 % — SIGNIFICANT CHANGE UP (ref 0–8)
GLUCOSE SERPL-MCNC: 103 MG/DL — HIGH (ref 70–99)
GLUCOSE SERPL-MCNC: 112 MG/DL — HIGH (ref 70–99)
HCT VFR BLD CALC: 37.6 % — SIGNIFICANT CHANGE UP (ref 37–47)
HCT VFR BLD CALC: 39.1 % — SIGNIFICANT CHANGE UP (ref 37–47)
HGB BLD-MCNC: 12.8 G/DL — SIGNIFICANT CHANGE UP (ref 12–16)
HGB BLD-MCNC: 13.3 G/DL — SIGNIFICANT CHANGE UP (ref 12–16)
IMM GRANULOCYTES NFR BLD AUTO: 0.4 % — HIGH (ref 0.1–0.3)
INR BLD: 1.08 RATIO — SIGNIFICANT CHANGE UP (ref 0.65–1.3)
LYMPHOCYTES # BLD AUTO: 2.77 K/UL — SIGNIFICANT CHANGE UP (ref 1.2–3.4)
LYMPHOCYTES # BLD AUTO: 27.5 % — SIGNIFICANT CHANGE UP (ref 20.5–51.1)
MCHC RBC-ENTMCNC: 30.1 PG — SIGNIFICANT CHANGE UP (ref 27–31)
MCHC RBC-ENTMCNC: 30.3 PG — SIGNIFICANT CHANGE UP (ref 27–31)
MCHC RBC-ENTMCNC: 34 G/DL — SIGNIFICANT CHANGE UP (ref 32–37)
MCHC RBC-ENTMCNC: 34 G/DL — SIGNIFICANT CHANGE UP (ref 32–37)
MCV RBC AUTO: 88.5 FL — SIGNIFICANT CHANGE UP (ref 81–99)
MCV RBC AUTO: 89.1 FL — SIGNIFICANT CHANGE UP (ref 81–99)
MONOCYTES # BLD AUTO: 0.58 K/UL — SIGNIFICANT CHANGE UP (ref 0.1–0.6)
MONOCYTES NFR BLD AUTO: 5.8 % — SIGNIFICANT CHANGE UP (ref 1.7–9.3)
NEUTROPHILS # BLD AUTO: 6.55 K/UL — HIGH (ref 1.4–6.5)
NEUTROPHILS NFR BLD AUTO: 64.9 % — SIGNIFICANT CHANGE UP (ref 42.2–75.2)
NRBC # BLD: 0 /100 WBCS — SIGNIFICANT CHANGE UP (ref 0–0)
NRBC # BLD: 0 /100 WBCS — SIGNIFICANT CHANGE UP (ref 0–0)
PLATELET # BLD AUTO: 301 K/UL — SIGNIFICANT CHANGE UP (ref 130–400)
PLATELET # BLD AUTO: 329 K/UL — SIGNIFICANT CHANGE UP (ref 130–400)
PMV BLD: 8.7 FL — SIGNIFICANT CHANGE UP (ref 7.4–10.4)
PMV BLD: 9 FL — SIGNIFICANT CHANGE UP (ref 7.4–10.4)
POTASSIUM SERPL-MCNC: 4.5 MMOL/L — SIGNIFICANT CHANGE UP (ref 3.5–5)
POTASSIUM SERPL-MCNC: 5.2 MMOL/L — HIGH (ref 3.5–5)
POTASSIUM SERPL-SCNC: 4.5 MMOL/L — SIGNIFICANT CHANGE UP (ref 3.5–5)
POTASSIUM SERPL-SCNC: 5.2 MMOL/L — HIGH (ref 3.5–5)
PROT SERPL-MCNC: 7.5 G/DL — SIGNIFICANT CHANGE UP (ref 6–8)
PROTHROM AB SERPL-ACNC: 12.3 SEC — SIGNIFICANT CHANGE UP (ref 9.95–12.87)
RBC # BLD: 4.25 M/UL — SIGNIFICANT CHANGE UP (ref 4.2–5.4)
RBC # BLD: 4.39 M/UL — SIGNIFICANT CHANGE UP (ref 4.2–5.4)
RBC # FLD: 13 % — SIGNIFICANT CHANGE UP (ref 11.5–14.5)
RBC # FLD: 13.1 % — SIGNIFICANT CHANGE UP (ref 11.5–14.5)
SODIUM SERPL-SCNC: 134 MMOL/L — LOW (ref 135–146)
SODIUM SERPL-SCNC: 140 MMOL/L — SIGNIFICANT CHANGE UP (ref 135–146)
WBC # BLD: 10.08 K/UL — SIGNIFICANT CHANGE UP (ref 4.8–10.8)
WBC # BLD: 8.31 K/UL — SIGNIFICANT CHANGE UP (ref 4.8–10.8)
WBC # FLD AUTO: 10.08 K/UL — SIGNIFICANT CHANGE UP (ref 4.8–10.8)
WBC # FLD AUTO: 8.31 K/UL — SIGNIFICANT CHANGE UP (ref 4.8–10.8)

## 2024-02-19 PROCEDURE — 80048 BASIC METABOLIC PNL TOTAL CA: CPT

## 2024-02-19 PROCEDURE — 85610 PROTHROMBIN TIME: CPT

## 2024-02-19 PROCEDURE — 87070 CULTURE OTHR SPECIMN AEROBIC: CPT

## 2024-02-19 PROCEDURE — 85730 THROMBOPLASTIN TIME PARTIAL: CPT

## 2024-02-19 PROCEDURE — 97162 PT EVAL MOD COMPLEX 30 MIN: CPT | Mod: GP

## 2024-02-19 PROCEDURE — 99221 1ST HOSP IP/OBS SF/LOW 40: CPT

## 2024-02-19 PROCEDURE — 97110 THERAPEUTIC EXERCISES: CPT | Mod: GP

## 2024-02-19 PROCEDURE — T1013: CPT

## 2024-02-19 PROCEDURE — 85027 COMPLETE CBC AUTOMATED: CPT

## 2024-02-19 PROCEDURE — 97116 GAIT TRAINING THERAPY: CPT | Mod: GP

## 2024-02-19 RX ORDER — MORPHINE SULFATE 50 MG/1
2 CAPSULE, EXTENDED RELEASE ORAL EVERY 6 HOURS
Refills: 0 | Status: DISCONTINUED | OUTPATIENT
Start: 2024-02-19 | End: 2024-02-22

## 2024-02-19 RX ORDER — CLONAZEPAM 1 MG
0.5 TABLET ORAL THREE TIMES A DAY
Refills: 0 | Status: DISCONTINUED | OUTPATIENT
Start: 2024-02-19 | End: 2024-02-22

## 2024-02-19 RX ORDER — HEPARIN SODIUM 5000 [USP'U]/ML
5000 INJECTION INTRAVENOUS; SUBCUTANEOUS EVERY 12 HOURS
Refills: 0 | Status: DISCONTINUED | OUTPATIENT
Start: 2024-02-19 | End: 2024-02-22

## 2024-02-19 RX ORDER — ASPIRIN/CALCIUM CARB/MAGNESIUM 324 MG
81 TABLET ORAL DAILY
Refills: 0 | Status: DISCONTINUED | OUTPATIENT
Start: 2024-02-19 | End: 2024-02-22

## 2024-02-19 RX ORDER — ATORVASTATIN CALCIUM 80 MG/1
80 TABLET, FILM COATED ORAL AT BEDTIME
Refills: 0 | Status: DISCONTINUED | OUTPATIENT
Start: 2024-02-19 | End: 2024-02-22

## 2024-02-19 RX ORDER — ACETAMINOPHEN 500 MG
650 TABLET ORAL EVERY 6 HOURS
Refills: 0 | Status: DISCONTINUED | OUTPATIENT
Start: 2024-02-19 | End: 2024-02-22

## 2024-02-19 RX ORDER — MORPHINE SULFATE 50 MG/1
4 CAPSULE, EXTENDED RELEASE ORAL ONCE
Refills: 0 | Status: DISCONTINUED | OUTPATIENT
Start: 2024-02-19 | End: 2024-02-19

## 2024-02-19 RX ORDER — SODIUM CHLORIDE 9 MG/ML
1000 INJECTION, SOLUTION INTRAVENOUS
Refills: 0 | Status: DISCONTINUED | OUTPATIENT
Start: 2024-02-19 | End: 2024-02-20

## 2024-02-19 RX ORDER — ALBUTEROL 90 UG/1
2 AEROSOL, METERED ORAL EVERY 6 HOURS
Refills: 0 | Status: DISCONTINUED | OUTPATIENT
Start: 2024-02-19 | End: 2024-02-22

## 2024-02-19 RX ORDER — MORPHINE SULFATE 50 MG/1
1 CAPSULE, EXTENDED RELEASE ORAL ONCE
Refills: 0 | Status: DISCONTINUED | OUTPATIENT
Start: 2024-02-19 | End: 2024-02-19

## 2024-02-19 RX ORDER — PANTOPRAZOLE SODIUM 20 MG/1
40 TABLET, DELAYED RELEASE ORAL
Refills: 0 | Status: DISCONTINUED | OUTPATIENT
Start: 2024-02-19 | End: 2024-02-22

## 2024-02-19 RX ADMIN — Medication 81 MILLIGRAM(S): at 12:15

## 2024-02-19 RX ADMIN — HEPARIN SODIUM 5000 UNIT(S): 5000 INJECTION INTRAVENOUS; SUBCUTANEOUS at 17:26

## 2024-02-19 RX ADMIN — MORPHINE SULFATE 4 MILLIGRAM(S): 50 CAPSULE, EXTENDED RELEASE ORAL at 00:55

## 2024-02-19 RX ADMIN — MORPHINE SULFATE 1 MILLIGRAM(S): 50 CAPSULE, EXTENDED RELEASE ORAL at 10:54

## 2024-02-19 RX ADMIN — ATORVASTATIN CALCIUM 80 MILLIGRAM(S): 80 TABLET, FILM COATED ORAL at 21:16

## 2024-02-19 RX ADMIN — HEPARIN SODIUM 5000 UNIT(S): 5000 INJECTION INTRAVENOUS; SUBCUTANEOUS at 05:44

## 2024-02-19 RX ADMIN — MORPHINE SULFATE 2 MILLIGRAM(S): 50 CAPSULE, EXTENDED RELEASE ORAL at 22:50

## 2024-02-19 RX ADMIN — MORPHINE SULFATE 2 MILLIGRAM(S): 50 CAPSULE, EXTENDED RELEASE ORAL at 23:20

## 2024-02-19 RX ADMIN — MORPHINE SULFATE 1 MILLIGRAM(S): 50 CAPSULE, EXTENDED RELEASE ORAL at 12:12

## 2024-02-19 RX ADMIN — PANTOPRAZOLE SODIUM 40 MILLIGRAM(S): 20 TABLET, DELAYED RELEASE ORAL at 17:26

## 2024-02-19 NOTE — PROGRESS NOTE ADULT - SUBJECTIVE AND OBJECTIVE BOX
SUBJECTIVE:    Patient is a 48y old Female who presents with a chief complaint of   Currently admitted to medicine with the primary diagnosis of Ambulatory dysfunction       Today is hospital day . This morning she is resting comfortably in bed and reports no new issues or overnight events.     reports + LLE pain     ROS:   CONSTITUTIONAL: No weakness, fevers or chills   EYES/ENT: No visual changes; No vertigo or throat pain   NECK: No pain or stiffness   RESPIRATORY: No cough, wheezing, hemoptysis; No shortness of breath   CARDIOVASCULAR: No chest pain or palpitations   GASTROINTESTINAL: No abdominal or epigastric pain. No nausea, vomiting, or hematemesis; No diarrhea or constipation. No melena or hematochezia.  GENITOURINARY: No dysuria, frequency or hematuria  NEUROLOGICAL: No numbness or weakness  SKIN: No itching, rashes      PAST MEDICAL & SURGICAL HISTORY  Asthma    Anxiety and depression    Vapes nicotine containing substance    S/P hernia repair    H/O cervical spine surgery      SOCIAL HISTORY:    ALLERGIES:  No Known Allergies    MEDICATIONS:  STANDING MEDICATIONS  aspirin  chewable 81 milliGRAM(s) Oral daily  atorvastatin 80 milliGRAM(s) Oral at bedtime  heparin   Injectable 5000 Unit(s) SubCutaneous every 12 hours    PRN MEDICATIONS  acetaminophen     Tablet .. 650 milliGRAM(s) Oral every 6 hours PRN  albuterol    90 MICROgram(s) HFA Inhaler 2 Puff(s) Inhalation every 6 hours PRN  clonazePAM  Tablet 0.5 milliGRAM(s) Oral three times a day PRN  oxycodone    5 mG/acetaminophen 325 mG 1 Tablet(s) Oral every 4 hours PRN    VITALS:   T(F): 97.8  HR: 83  BP: 108/60  RR: 18  SpO2: 97%    LABS:  Negative for smoking/alcohol/drug use.                         12.8   8.31  )-----------( 301      ( 19 Feb 2024 09:07 )             37.6     02-19    140  |  103  |  11  ----------------------------<  103<H>  4.5   |  26  |  0.6<L>    Ca    9.2      19 Feb 2024 09:07    TPro  7.5  /  Alb  4.3  /  TBili  0.2  /  DBili  x   /  AST  16  /  ALT  17  /  AlkPhos  110  02-19    PT/INR - ( 19 Feb 2024 09:07 )   PT: 12.30 sec;   INR: 1.08 ratio         PTT - ( 19 Feb 2024 09:07 )  PTT:37.6 sec  Urinalysis Basic - ( 19 Feb 2024 09:07 )    Color: x / Appearance: x / SG: x / pH: x  Gluc: 103 mg/dL / Ketone: x  / Bili: x / Urobili: x   Blood: x / Protein: x / Nitrite: x   Leuk Esterase: x / RBC: x / WBC x   Sq Epi: x / Non Sq Epi: x / Bacteria: x                RADIOLOGY:    PHYSICAL EXAM:  GEN: No acute distress  HEENT: normocephalic, atraumatic, aniceteric  LUNGS: bl breath sounds   HEART: S1/S2 present. RRR, no murmurs  ABD: Soft, non-tender, non-distended. Bowel sounds present  EXT: LLE in splint   NEURO: AAOX3, normal affect      ASSESSMENT AND PLAN:    47yo female who has history of asthma, acute right parietal infarct (Aug 2021), anxiety with depression, and who was recently in our ER due to left ankle fracture after a fall (Feb 16 - splint applied), presents to the ER today due to tenderness to area along with paresthesia to distal toes/ She also reports difficulty with ambulation at home. When splint was removed, blisters to the skin were noted. A new splint was then applied to left lower leg    # Difficulty ambulating  # LLE bimalleolar ankle fx (sp splint exchange 2/18) dx'ed 2/1624   - pt was in emergency room 2/16 - was discharged home from ED with op ortho fu   - patient comes back given pain   - able to move toes, no numbness, no ternderness to knee   - Ortho eval appreciated - NWB w/ crutches LLE ; need sx   - DVT PPX , Bowel regimen, Pain control   - PT / Physiatry - rehab     # H/O CVA - asa, statin    # H/O Mood disorder; denies si/ hi ; cw home medications    # dvt/ gi ppx/diet  # dispo: acute  # handoff: ortho fu

## 2024-02-19 NOTE — PROGRESS NOTE ADULT - SUBJECTIVE AND OBJECTIVE BOX
spoke to dr rivera  he may be able to add on for orif tomorrow if OR can accomodate  please keep NPO with IV fluids after midnight.

## 2024-02-19 NOTE — PATIENT PROFILE ADULT - FALL HARM RISK - HARM RISK INTERVENTIONS

## 2024-02-19 NOTE — PATIENT PROFILE ADULT - FUNCTIONAL ASSESSMENT - BASIC MOBILITY 6.
2-calculated by average/Not able to assess (calculate score using [facility] averaging method)

## 2024-02-19 NOTE — PHYSICAL THERAPY INITIAL EVALUATION ADULT - SPECIFY REASON(S)
Pt refused to participate in PT secondary to having pain in L LE and feeling dizzy; family present bedside. RN aware. Will follow-up as appropriate to complete PT evaluation.

## 2024-02-19 NOTE — CONSULT NOTE ADULT - ASSESSMENT
IMPRESSION: Rehab of 49 y/o  f rehab   for  ankle  fx  sp  fall      PRECAUTIONS: [  ] Cardiac  [  ] Respiratory  [  ] Seizures [  ] Contact Isolation  [  ] Droplet Isolation  [ FALL ] Other    Weight Bearing Status: Nwb lt  le     RECOMMENDATION:    Out of Bed to Chair     DVT/Decubiti Prophylaxis    REHAB PLAN:     [ x  ] Bedside P/T 3-5 times a week   [x   ]   Bedside O/T  2-3 times a week             [   ] No Rehab Therapy Indicated                   [   ]  Speech Therapy   Conditioning/ROM                                    ADL  Bed Mobility                                               Conditioning/ROM  Transfers                                                     Bed Mobility  Sitting /Standing Balance                         Transfers                                        Gait Training                                               Sitting/Standing Balance  Stair Training [   ]Applicable                    Home equipment Eval                                                                        Splinting  [   ] Only      GOALS:   ADL   [  x ]   Independent                    Transfers  [  x ] Independent                          Ambulation  [x   ] Independent     [    ] With device                            [   ]  CG                                                         [   ]  CG                                                                  [   ] CG                            [    ] Min A                                                   [   ] Min A                                                              [   ] Min  A          DISCHARGE PLAN:   [   ]  Good candidate for Intensive Rehabilitation/Hospital based-[facility]A [facility]                                             Will tolerate 3hrs Intensive Rehab Daily                                       [ xx ]  Short Term Rehab in Skilled Nursing Facility orthopedic  fu                                        [    ]  Home with Outpatient or VN services                                         [    ]  Possible Candidate for Intensive Hospital based Rehab

## 2024-02-20 ENCOUNTER — TRANSCRIPTION ENCOUNTER (OUTPATIENT)
Age: 49
End: 2024-02-20

## 2024-02-20 DIAGNOSIS — S82.842A DISPLACED BIMALLEOLAR FRACTURE OF LEFT LOWER LEG, INITIAL ENCOUNTER FOR CLOSED FRACTURE: ICD-10-CM

## 2024-02-20 PROCEDURE — 99232 SBSQ HOSP IP/OBS MODERATE 35: CPT

## 2024-02-20 RX ORDER — CLONAZEPAM 1 MG
0 TABLET ORAL
Qty: 0 | Refills: 0 | DISCHARGE

## 2024-02-20 RX ORDER — CLONAZEPAM 1 MG
1 TABLET ORAL
Qty: 0 | Refills: 0 | DISCHARGE
Start: 2024-02-20

## 2024-02-20 RX ADMIN — ATORVASTATIN CALCIUM 80 MILLIGRAM(S): 80 TABLET, FILM COATED ORAL at 21:21

## 2024-02-20 RX ADMIN — MORPHINE SULFATE 2 MILLIGRAM(S): 50 CAPSULE, EXTENDED RELEASE ORAL at 07:02

## 2024-02-20 RX ADMIN — MORPHINE SULFATE 2 MILLIGRAM(S): 50 CAPSULE, EXTENDED RELEASE ORAL at 13:18

## 2024-02-20 RX ADMIN — PANTOPRAZOLE SODIUM 40 MILLIGRAM(S): 20 TABLET, DELAYED RELEASE ORAL at 16:46

## 2024-02-20 RX ADMIN — MORPHINE SULFATE 2 MILLIGRAM(S): 50 CAPSULE, EXTENDED RELEASE ORAL at 13:19

## 2024-02-20 RX ADMIN — Medication 81 MILLIGRAM(S): at 11:04

## 2024-02-20 RX ADMIN — MORPHINE SULFATE 2 MILLIGRAM(S): 50 CAPSULE, EXTENDED RELEASE ORAL at 21:20

## 2024-02-20 RX ADMIN — MORPHINE SULFATE 2 MILLIGRAM(S): 50 CAPSULE, EXTENDED RELEASE ORAL at 06:51

## 2024-02-20 RX ADMIN — SODIUM CHLORIDE 75 MILLILITER(S): 9 INJECTION, SOLUTION INTRAVENOUS at 00:16

## 2024-02-20 NOTE — DISCHARGE NOTE PROVIDER - CARE PROVIDER_API CALL
Julio Vazquez  Orthopaedic Surgery  [address] [place] [address]  Phone: [phone]  Fax: [phone]  Follow Up Time:

## 2024-02-20 NOTE — DISCHARGE NOTE PROVIDER - NSDCMRMEDTOKEN_GEN_ALL_CORE_FT
Albuterol (Eqv-ProAir HFA) 90 mcg/inh inhalation aerosol: 2 puff(s) inhaled every 4 hours, As Needed -for bronchospasm   aspirin 81 mg oral tablet, chewable: 1 tab(s) orally once a day  atorvastatin 80 mg oral tablet: 1 tab(s) orally once a day (at bedtime)  clonazePAM 0.5 mg oral tablet: 1 tab(s) orally 3 times a day As needed anxiety  FLUoxetine:   midodrine 5 mg oral tablet: 1 tab(s) orally every 8 hours ( hold for SBP&gt;130)  Percocet 5 mg-325 mg oral tablet: 1 tab(s) orally every 4 hours as needed for  severe pain MDD: 6  Spiriva HandiHaler 18 mcg inhalation capsule: 1 cap(s) inhaled once a day   Symbicort 160 mcg-4.5 mcg/inh inhalation aerosol: 2 puff(s) inhaled 2 times a day    Albuterol (Eqv-ProAir HFA) 90 mcg/inh inhalation aerosol: 2 puff(s) inhaled every 4 hours, As Needed -for bronchospasm   aspirin 81 mg oral tablet, chewable: 1 tab(s) orally once a day  atorvastatin 80 mg oral tablet: 1 tab(s) orally once a day (at bedtime)  clonazePAM 0.5 mg oral tablet: 1 tab(s) orally 3 times a day As needed anxiety  midodrine 5 mg oral tablet: 1 tab(s) orally every 8 hours ( hold for SBP&gt;130)  Percocet 5 mg-325 mg oral tablet: 1 tab(s) orally every 4 hours as needed for  severe pain MDD: 6  Spiriva HandiHaler 18 mcg inhalation capsule: 1 cap(s) inhaled once a day   Symbicort 160 mcg-4.5 mcg/inh inhalation aerosol: 2 puff(s) inhaled 2 times a day    Albuterol (Eqv-ProAir HFA) 90 mcg/inh inhalation aerosol: 2 puff(s) inhaled every 4 hours, As Needed -for bronchospasm   aspirin 81 mg oral tablet, chewable: 1 tab(s) orally once a day  atorvastatin 80 mg oral tablet: 1 tab(s) orally once a day (at bedtime)  clonazePAM 0.5 mg oral tablet: 1 tab(s) orally 3 times a day As needed anxiety  ketorolac 10 mg oral tablet: 1 tab(s) orally every 6 hours as needed for  severe pain  midodrine 5 mg oral tablet: 1 tab(s) orally every 8 hours ( hold for SBP&gt;130)  Percocet 5 mg-325 mg oral tablet: 1 tab(s) orally every 4 hours as needed for  severe pain MDD: 6  Spiriva HandiHaler 18 mcg inhalation capsule: 1 cap(s) inhaled once a day   Symbicort 160 mcg-4.5 mcg/inh inhalation aerosol: 2 puff(s) inhaled 2 times a day

## 2024-02-20 NOTE — PHYSICAL THERAPY INITIAL EVALUATION ADULT - PERTINENT HX OF CURRENT PROBLEM, REHAB EVAL
History of Present Illness:   (Used both ER records and for further details,  phone [phone] though patient speaks some English) 47yo female who has history of asthma, acute right parietal infarct (Aug 2021), anxiety with depression, and who was recently in our ER due to left ankle fracture after a fall (Feb 16 - splint applied), presents to the ER today due to tenderness to area along with paresthesia to distal toes/ She also reports difficulty with ambulation at home. When splint was removed, blisters to the skin were noted. A new splint was then applied to left lower leg

## 2024-02-20 NOTE — PROGRESS NOTE ADULT - SUBJECTIVE AND OBJECTIVE BOX
Pt was seen at bedside today. Pain well controlled.     ON exam, ankle quite swollen but skin intact.   No blistering  Compartments soft/compressible  NVID

## 2024-02-20 NOTE — PROGRESS NOTE ADULT - PROBLEM SELECTOR PLAN 1
- Patient too swollen for surgery for this admission. Would recommend to discharge to SNF. If possible, please clear patient for surgery prior to discharge. E    - Elevate and ice     - NWB LLE    - DVT ppx

## 2024-02-20 NOTE — PROGRESS NOTE ADULT - SUBJECTIVE AND OBJECTIVE BOX
SUBJECTIVE:    Patient is a 49y old Female who presents with a chief complaint of   Currently admitted to medicine with the primary diagnosis of Ambulatory dysfunction       Today is hospital day 1d. This morning she is resting comfortably in bed and reports no new issues or overnight events.     ROS:   CONSTITUTIONAL: No weakness, fevers or chills   EYES/ENT: No visual changes; No vertigo or throat pain   NECK: No pain or stiffness   RESPIRATORY: No cough, wheezing, hemoptysis; No shortness of breath   CARDIOVASCULAR: No chest pain or palpitations   GASTROINTESTINAL: No abdominal or epigastric pain. No nausea, vomiting, or hematemesis; No diarrhea or constipation. No melena or hematochezia.  GENITOURINARY: No dysuria, frequency or hematuria  NEUROLOGICAL: No numbness or weakness  SKIN: No itching, rashes      PAST MEDICAL & SURGICAL HISTORY  Asthma    Anxiety and depression    Vapes nicotine containing substance    S/P hernia repair    H/O cervical spine surgery      SOCIAL HISTORY:    ALLERGIES:  No Known Allergies    MEDICATIONS:  STANDING MEDICATIONS  aspirin  chewable 81 milliGRAM(s) Oral daily  atorvastatin 80 milliGRAM(s) Oral at bedtime  heparin   Injectable 5000 Unit(s) SubCutaneous every 12 hours  pantoprazole    Tablet 40 milliGRAM(s) Oral before breakfast    PRN MEDICATIONS  acetaminophen     Tablet .. 650 milliGRAM(s) Oral every 6 hours PRN  albuterol    90 MICROgram(s) HFA Inhaler 2 Puff(s) Inhalation every 6 hours PRN  clonazePAM  Tablet 0.5 milliGRAM(s) Oral three times a day PRN  morphine  - Injectable 2 milliGRAM(s) IV Push every 6 hours PRN  oxycodone    5 mG/acetaminophen 325 mG 1 Tablet(s) Oral every 4 hours PRN    VITALS:   T(F): 96.7  HR: 89  BP: 102/71  RR: 18  SpO2: 95%    LABS:  Negative for smoking/alcohol/drug use.                         12.8   8.31  )-----------( 301      ( 19 Feb 2024 09:07 )             37.6     02-19    140  |  103  |  11  ----------------------------<  103<H>  4.5   |  26  |  0.6<L>    Ca    9.2      19 Feb 2024 09:07    TPro  7.5  /  Alb  4.3  /  TBili  0.2  /  DBili  x   /  AST  16  /  ALT  17  /  AlkPhos  110  02-19    PT/INR - ( 19 Feb 2024 09:07 )   PT: 12.30 sec;   INR: 1.08 ratio         PTT - ( 19 Feb 2024 09:07 )  PTT:37.6 sec  Urinalysis Basic - ( 19 Feb 2024 09:07 )    Color: x / Appearance: x / SG: x / pH: x  Gluc: 103 mg/dL / Ketone: x  / Bili: x / Urobili: x   Blood: x / Protein: x / Nitrite: x   Leuk Esterase: x / RBC: x / WBC x   Sq Epi: x / Non Sq Epi: x / Bacteria: x        RADIOLOGY:    PHYSICAL EXAM:  GEN: No acute distress  HEENT: normocephalic, atraumatic, aniceteric  LUNGS: Clear to auscultation bilaterally, no rales/wheezing/ rhonchi  HEART: S1/S2 present. RRR, no murmurs  ABD: Soft, non-tender, non-distended. Bowel sounds present  EXT: LLE in splint   NEURO: AAOX3, normal affect      ASSESSMENT AND PLAN:    47yo female who has history of asthma, acute right parietal infarct (Aug 2021), anxiety with depression, and who was recently in our ER due to left ankle fracture after a fall (Feb 16 - splint applied), presents to the ER today due to tenderness to area along with paresthesia to distal toes/ She also reports difficulty with ambulation at home. When splint was removed, blisters to the skin were noted. A new splint was then applied to left lower leg    # Difficulty ambulating  # LLE bimalleolar ankle fx (sp splint exchange 2/18) dx'ed 2/1624   - pt was in emergency room 2/16 - was discharged home from ED with op ortho fu   - patient comes back given pain   - able to move toes, no numbness, no ternderness to knee   - Ortho eval appreciated - NWB w/ crutches LLE - no inpatient intervention - will need sx outpatient   - DVT PPX , Bowel regimen, Pain control   - PT / Physiatry - rehab     # H/O CVA - asa, statin    # H/O Mood disorder; denies si/ hi ; cw home medications    # dvt/ gi ppx/diet  # dispo: dc ready   # handoff: pending auth to [facility] - dc ready    SUBJECTIVE:    Patient is a 49y old Female who presents with a chief complaint of   Currently admitted to medicine with the primary diagnosis of Ambulatory dysfunction       Today is hospital day 1d. This morning she is resting comfortably in bed and reports no new issues or overnight events.     ROS:   CONSTITUTIONAL: No weakness, fevers or chills   EYES/ENT: No visual changes; No vertigo or throat pain   NECK: No pain or stiffness   RESPIRATORY: No cough, wheezing, hemoptysis; No shortness of breath   CARDIOVASCULAR: No chest pain or palpitations   GASTROINTESTINAL: No abdominal or epigastric pain. No nausea, vomiting, or hematemesis; No diarrhea or constipation. No melena or hematochezia.  GENITOURINARY: No dysuria, frequency or hematuria  NEUROLOGICAL: No numbness or weakness  SKIN: No itching, rashes      PAST MEDICAL & SURGICAL HISTORY  Asthma    Anxiety and depression    Vapes nicotine containing substance    S/P hernia repair    H/O cervical spine surgery      SOCIAL HISTORY:    ALLERGIES:  No Known Allergies    MEDICATIONS:  STANDING MEDICATIONS  aspirin  chewable 81 milliGRAM(s) Oral daily  atorvastatin 80 milliGRAM(s) Oral at bedtime  heparin   Injectable 5000 Unit(s) SubCutaneous every 12 hours  pantoprazole    Tablet 40 milliGRAM(s) Oral before breakfast    PRN MEDICATIONS  acetaminophen     Tablet .. 650 milliGRAM(s) Oral every 6 hours PRN  albuterol    90 MICROgram(s) HFA Inhaler 2 Puff(s) Inhalation every 6 hours PRN  clonazePAM  Tablet 0.5 milliGRAM(s) Oral three times a day PRN  morphine  - Injectable 2 milliGRAM(s) IV Push every 6 hours PRN  oxycodone    5 mG/acetaminophen 325 mG 1 Tablet(s) Oral every 4 hours PRN    VITALS:   T(F): 96.7  HR: 89  BP: 102/71  RR: 18  SpO2: 95%    LABS:  Negative for smoking/alcohol/drug use.                         12.8   8.31  )-----------( 301      ( 19 Feb 2024 09:07 )             37.6     02-19    140  |  103  |  11  ----------------------------<  103<H>  4.5   |  26  |  0.6<L>    Ca    9.2      19 Feb 2024 09:07    TPro  7.5  /  Alb  4.3  /  TBili  0.2  /  DBili  x   /  AST  16  /  ALT  17  /  AlkPhos  110  02-19    PT/INR - ( 19 Feb 2024 09:07 )   PT: 12.30 sec;   INR: 1.08 ratio         PTT - ( 19 Feb 2024 09:07 )  PTT:37.6 sec  Urinalysis Basic - ( 19 Feb 2024 09:07 )    Color: x / Appearance: x / SG: x / pH: x  Gluc: 103 mg/dL / Ketone: x  / Bili: x / Urobili: x   Blood: x / Protein: x / Nitrite: x   Leuk Esterase: x / RBC: x / WBC x   Sq Epi: x / Non Sq Epi: x / Bacteria: x        RADIOLOGY:    PHYSICAL EXAM:  GEN: No acute distress  HEENT: normocephalic, atraumatic, aniceteric  LUNGS: Clear to auscultation bilaterally, no rales/wheezing/ rhonchi  HEART: S1/S2 present. RRR, no murmurs  ABD: Soft, non-tender, non-distended. Bowel sounds present  EXT: LLE in splint   NEURO: AAOX3, normal affect      ASSESSMENT AND PLAN:    49yo female who has history of asthma, acute right parietal infarct (Aug 2021), anxiety with depression, and who was recently in our ER due to left ankle fracture after a fall (Feb 16 - splint applied), presents to the ER today due to tenderness to area along with paresthesia to distal toes/ She also reports difficulty with ambulation at home. When splint was removed, blisters to the skin were noted. A new splint was then applied to left lower leg    # Difficulty ambulating  # LLE bimalleolar ankle fx (sp splint exchange 2/18) dx'ed 2/1624   - pt was in emergency room 2/16 - was discharged home from ED with op ortho fu   - patient comes back given pain   - able to move toes, no numbness, no ternderness to knee   - Ortho eval appreciated - NWB w/ crutches LLE - no inpatient intervention - will need sx outpatient   - DVT PPX , Bowel regimen, Pain control   - PT / Physiatry - rehab     # H/O CVA - asa, statin    # H/O Mood disorder; denies si/ hi ; cw home medications ; fluoxetine stopped > 3 months ago     # dvt/ gi ppx/diet  # dispo: dc ready   # handoff: pending auth to [facility] - jonatan ready

## 2024-02-20 NOTE — PHYSICAL THERAPY INITIAL EVALUATION ADULT - GENERAL OBSERVATIONS, REHAB EVAL
10:03-10:26 Chart reviewed. Patient available to be seen for physical therapy, denies pain, confirmed with RN.  Pt rec'd in bed +IV, +L  foot/ankle splinted and Ace wrapped in NAD.

## 2024-02-20 NOTE — DISCHARGE NOTE PROVIDER - HOSPITAL COURSE
49yo female who has history of asthma, acute right parietal infarct (Aug 2021), anxiety with depression, and who was recently in our ER due to left ankle fracture after a fall (Feb 16 - splint applied), presents to the ER today due to tenderness to area along with paresthesia to distal toes/ She also reports difficulty with ambulation at home. When splint was removed, blisters to the skin were noted. A new splint was then applied to left lower leg    # Difficulty ambulating  # LLE bimalleolar ankle fx (sp splint exchange 2/18) dx'ed 2/1624   - pt was in emergency room 2/16 - was discharged home from ED with op ortho fu   - patient comes back given pain   - able to move toes, no numbness, no ternderness to knee   - Ortho eval appreciated - NWB w/ crutches LLE - no inpatient intervention - will need sx outpatient   - DVT PPX , Bowel regimen, Pain control   - PT / Physiatry - rehab     # H/O CVA - asa, statin    # H/O Mood disorder; denies si/ hi ; cw home medications     49yo female who has history of asthma, acute right parietal infarct (Aug 2021), anxiety with depression, and who was recently in our ER due to left ankle fracture after a fall (Feb 16 - splint applied), presents to the ER today due to tenderness to area along with paresthesia to distal toes/ She also reports difficulty with ambulation at home. When splint was removed, blisters to the skin were noted. A new splint was then applied to left lower leg    # Difficulty ambulating  # LLE bimalleolar ankle fx (sp splint exchange 2/18) dx'ed 2/1624   - pt was in emergency room 2/16 - was discharged home from ED with op ortho fu   - patient comes back given pain   - able to move toes, no numbness, no ternderness to knee   - Ortho eval appreciated - NWB w/ crutches LLE - no inpatient intervention - will need sx outpatient   - DVT PPX , Bowel regimen, Pain control   - PT / Physiatry - rehab     # H/O CVA - asa, statin    # H/O Mood disorder; denies si/ hi ; cw home medications (fluoxetine stopped > 3 months ago)     47yo female who has history of asthma, acute right parietal infarct (Aug 2021), anxiety with depression, and who was recently in our ER due to left ankle fracture after a fall (Feb 16 - splint applied), presents to the ER today due to tenderness to area along with paresthesia to distal toes/ She also reports difficulty with ambulation at home. When splint was removed, blisters to the skin were noted. A new splint was then applied to left lower leg    # Difficulty ambulating  # LLE bimalleolar ankle fx s/p splinter and dx'ed 2/16/24. S/p splint exchange 2/18   # LLE Hemorrhagic appearing blisters  - pt was in emergency room 2/16 - was discharged home from ED with op ortho fu, then returned to ED due to pain  - Blisters with yellow-appearing fluid seen on splint exchange 2/18, now progressed to hemorrhagic appearing and painful   - S/p blister drainage 2/21  - Ortho eval appreciated - NWB w/ crutches LLE - no inpatient intervention - will need sx outpatient. tentatively scheduled for Tuesday 2/27  - DVT PPX , Bowel regimen, Pain control   - PT / Physiatry - rehab. [place] how to use crutches     # Medical risk stratification   - RCRI score is 1, which confers a 6% 30-day risk of death, MI, or cardiac arrest. MET >4. Pt is a moderate risk patient for a moderate risk procedure.     # H/O CVA - asa, statin    # H/O Mood disorder; denies si/ hi ; cw home medications ; fluoxetine stopped > 3 months ago

## 2024-02-20 NOTE — DISCHARGE NOTE PROVIDER - NSDCCPCAREPLAN_GEN_ALL_CORE_FT
PRINCIPAL DISCHARGE DIAGNOSIS  Diagnosis: Ambulatory dysfunction  Assessment and Plan of Treatment: physical therapy  non weight bearing to LLE with crutches      SECONDARY DISCHARGE DIAGNOSES  Diagnosis: Ankle fracture  Assessment and Plan of Treatment: you were evaluated by orthopedic team - given edema - recommended outpatient follow up for surgery , no inpatient intervention, continue with pt and pain control; elevate extremity and apply cold compress     PRINCIPAL DISCHARGE DIAGNOSIS  Diagnosis: Ambulatory dysfunction  Assessment and Plan of Treatment: physical therapy  non weight bearing to LLE with crutches. You will have a surgical procedure done with ortho, most likely on Tuesday 2/17. The orthopedic surgical team will confirm the details with you.      SECONDARY DISCHARGE DIAGNOSES  Diagnosis: Ankle fracture  Assessment and Plan of Treatment: you were evaluated by orthopedic team - given edema - recommended outpatient follow up for surgery (likely next Tuesday as above), no inpatient intervention, continue with pt and pain control; elevate extremity and apply cold compress.

## 2024-02-21 PROCEDURE — 99232 SBSQ HOSP IP/OBS MODERATE 35: CPT

## 2024-02-21 RX ORDER — MORPHINE SULFATE 50 MG/1
2 CAPSULE, EXTENDED RELEASE ORAL ONCE
Refills: 0 | Status: DISCONTINUED | OUTPATIENT
Start: 2024-02-21 | End: 2024-02-21

## 2024-02-21 RX ADMIN — ATORVASTATIN CALCIUM 80 MILLIGRAM(S): 80 TABLET, FILM COATED ORAL at 21:39

## 2024-02-21 RX ADMIN — MORPHINE SULFATE 2 MILLIGRAM(S): 50 CAPSULE, EXTENDED RELEASE ORAL at 18:42

## 2024-02-21 RX ADMIN — MORPHINE SULFATE 2 MILLIGRAM(S): 50 CAPSULE, EXTENDED RELEASE ORAL at 21:25

## 2024-02-21 RX ADMIN — MORPHINE SULFATE 2 MILLIGRAM(S): 50 CAPSULE, EXTENDED RELEASE ORAL at 22:25

## 2024-02-21 RX ADMIN — HEPARIN SODIUM 5000 UNIT(S): 5000 INJECTION INTRAVENOUS; SUBCUTANEOUS at 18:36

## 2024-02-21 RX ADMIN — MORPHINE SULFATE 2 MILLIGRAM(S): 50 CAPSULE, EXTENDED RELEASE ORAL at 13:09

## 2024-02-21 RX ADMIN — HEPARIN SODIUM 5000 UNIT(S): 5000 INJECTION INTRAVENOUS; SUBCUTANEOUS at 06:10

## 2024-02-21 RX ADMIN — PANTOPRAZOLE SODIUM 40 MILLIGRAM(S): 20 TABLET, DELAYED RELEASE ORAL at 06:10

## 2024-02-21 RX ADMIN — MORPHINE SULFATE 2 MILLIGRAM(S): 50 CAPSULE, EXTENDED RELEASE ORAL at 06:09

## 2024-02-21 RX ADMIN — Medication 81 MILLIGRAM(S): at 11:07

## 2024-02-21 NOTE — CHART NOTE - NSCHARTNOTEFT_GEN_A_CORE
Consult received for Pressure Injury Stage 2 or greater    Pt has blisters associated with ankle injury, and with good nutrition at this time.    RD to follow up per protocol.    Neeta Kim RD [phone] or via teams

## 2024-02-21 NOTE — PROGRESS NOTE ADULT - SUBJECTIVE AND OBJECTIVE BOX
Hospital Day:  2d    Subjective:    Patient is a 49y old  Female who presents with a chief complaint of     Past Medical Hx:   Asthma    Anxiety and depression    Vapes nicotine containing substance      Past Sx:  S/P hernia repair    H/O cervical spine surgery      Allergies:  No Known Allergies    Current Meds:   Standng Meds:  aspirin  chewable 81 milliGRAM(s) Oral daily  atorvastatin 80 milliGRAM(s) Oral at bedtime  heparin   Injectable 5000 Unit(s) SubCutaneous every 12 hours  pantoprazole    Tablet 40 milliGRAM(s) Oral before breakfast    PRN Meds:  acetaminophen     Tablet .. 650 milliGRAM(s) Oral every 6 hours PRN Mild Pain (1 - 3)  albuterol    90 MICROgram(s) HFA Inhaler 2 Puff(s) Inhalation every 6 hours PRN Shortness of Breath and/or Wheezing  clonazePAM  Tablet 0.5 milliGRAM(s) Oral three times a day PRN anxiety  morphine  - Injectable 2 milliGRAM(s) IV Push every 6 hours PRN Severe Pain (7 - 10)  oxycodone    5 mG/acetaminophen 325 mG 1 Tablet(s) Oral every 4 hours PRN Moderate Pain (4 - 6)    HOME MEDICATIONS:  clonazePAM 0.5 mg oral tablet: 1 tab(s) orally 3 times a day As needed anxiety      Vital Signs:   T(F): 96.7 (02-21-24 @ 14:00), Max: 97.5 (02-21-24 @ 05:29)  HR: 106 (02-21-24 @ 14:00) (77 - 106)  BP: 133/60 (02-21-24 @ 14:00) (109/66 - 133/60)  RR: 18 (02-21-24 @ 14:00) (18 - 18)  SpO2: --        Physical Exam:   GENERAL: NAD  HEENT: NCAT  CHEST/LUNG: CTAB  HEART: Regular rate and rhythm; s1 s2 appreciated, No murmurs, rubs, or gallops  ABDOMEN: Soft, Nontender, Nondistended; Bowel sounds present  EXTREMITIES: No LE edema b/l  SKIN: no rashes, no new lesions  NERVOUS SYSTEM:  Alert & Oriented X3  LINES/CATHETERS:        Labs:                             Urinalysis Basic - ( 19 Feb 2024 09:07 )    Color: x / Appearance: x / SG: x / pH: x  Gluc: 103 mg/dL / Ketone: x  / Bili: x / Urobili: x   Blood: x / Protein: x / Nitrite: x   Leuk Esterase: x / RBC: x / WBC x   Sq Epi: x / Non Sq Epi: x / Bacteria: x             Hospital Day:  2d    Subjective:   49y old  Female who presents with a chief complaint of s/p fall. Found crying in chair this morning, speaking with dtr on the phone. Endorses multiple complaints: worsening LLE blisters, LLE pain while using crutches, states splint is bothering her, fatigued     Past Medical Hx:   Asthma    Anxiety and depression    Vapes nicotine containing substance      Past Sx:  S/P hernia repair    H/O cervical spine surgery      Allergies:  No Known Allergies    Current Meds:   Standng Meds:  aspirin  chewable 81 milliGRAM(s) Oral daily  atorvastatin 80 milliGRAM(s) Oral at bedtime  heparin   Injectable 5000 Unit(s) SubCutaneous every 12 hours  pantoprazole    Tablet 40 milliGRAM(s) Oral before breakfast    PRN Meds:  acetaminophen     Tablet .. 650 milliGRAM(s) Oral every 6 hours PRN Mild Pain (1 - 3)  albuterol    90 MICROgram(s) HFA Inhaler 2 Puff(s) Inhalation every 6 hours PRN Shortness of Breath and/or Wheezing  clonazePAM  Tablet 0.5 milliGRAM(s) Oral three times a day PRN anxiety  morphine  - Injectable 2 milliGRAM(s) IV Push every 6 hours PRN Severe Pain (7 - 10)  oxycodone    5 mG/acetaminophen 325 mG 1 Tablet(s) Oral every 4 hours PRN Moderate Pain (4 - 6)    HOME MEDICATIONS:  clonazePAM 0.5 mg oral tablet: 1 tab(s) orally 3 times a day As needed anxiety      Vital Signs:   T(F): 96.7 (02-21-24 @ 14:00), Max: 97.5 (02-21-24 @ 05:29)  HR: 106 (02-21-24 @ 14:00) (77 - 106)  BP: 133/60 (02-21-24 @ 14:00) (109/66 - 133/60)  RR: 18 (02-21-24 @ 14:00) (18 - 18)  SpO2: --        Physical Exam:   GENERAL: NAD  HEENT: NCAT  CHEST/LUNG: CTAB  HEART: Regular rate and rhythm; s1 s2 appreciated, No murmurs, rubs, or gallops  ABDOMEN: Soft, Nontender, Nondistended; Bowel sounds present  EXTREMITIES: LLE 1+ edema  SKIN: 3 large hemorrhagic bullae, 1 smaller. no erythema on tissue surrounding blisters     NERVOUS SYSTEM:  Alert & Oriented X3        Labs:                             Urinalysis Basic - ( 19 Feb 2024 09:07 )    Color: x / Appearance: x / SG: x / pH: x  Gluc: 103 mg/dL / Ketone: x  / Bili: x / Urobili: x   Blood: x / Protein: x / Nitrite: x   Leuk Esterase: x / RBC: x / WBC x   Sq Epi: x / Non Sq Epi: x / Bacteria: x             Hospital Day:  2d    Subjective:   49y old  Female who presents with a chief complaint of s/p fall. Found crying in chair this morning, speaking with dtr on the phone. Endorses multiple complaints: worsening LLE blisters, LLE pain while using crutches, states splint is bothering her, fatigued, 10/10 in LLE at rest.     Past Medical Hx:   Asthma    Anxiety and depression    Vapes nicotine containing substance      Past Sx:  S/P hernia repair    H/O cervical spine surgery      Allergies:  No Known Allergies    Current Meds:   Standng Meds:  aspirin  chewable 81 milliGRAM(s) Oral daily  atorvastatin 80 milliGRAM(s) Oral at bedtime  heparin   Injectable 5000 Unit(s) SubCutaneous every 12 hours  pantoprazole    Tablet 40 milliGRAM(s) Oral before breakfast    PRN Meds:  acetaminophen     Tablet .. 650 milliGRAM(s) Oral every 6 hours PRN Mild Pain (1 - 3)  albuterol    90 MICROgram(s) HFA Inhaler 2 Puff(s) Inhalation every 6 hours PRN Shortness of Breath and/or Wheezing  clonazePAM  Tablet 0.5 milliGRAM(s) Oral three times a day PRN anxiety  morphine  - Injectable 2 milliGRAM(s) IV Push every 6 hours PRN Severe Pain (7 - 10)  oxycodone    5 mG/acetaminophen 325 mG 1 Tablet(s) Oral every 4 hours PRN Moderate Pain (4 - 6)    HOME MEDICATIONS:  clonazePAM 0.5 mg oral tablet: 1 tab(s) orally 3 times a day As needed anxiety      Vital Signs:   T(F): 96.7 (02-21-24 @ 14:00), Max: 97.5 (02-21-24 @ 05:29)  HR: 106 (02-21-24 @ 14:00) (77 - 106)  BP: 133/60 (02-21-24 @ 14:00) (109/66 - 133/60)  RR: 18 (02-21-24 @ 14:00) (18 - 18)  SpO2: --        Physical Exam:   GENERAL: NAD  HEENT: NCAT  CHEST/LUNG: CTAB  HEART: Regular rate and rhythm; s1 s2 appreciated, No murmurs, rubs, or gallops  ABDOMEN: Soft, Nontender, Nondistended; Bowel sounds present  EXTREMITIES: LLE 1+ edema  SKIN: 3 large hemorrhagic bullae, 1 smaller. no erythema on tissue surrounding blisters     NERVOUS SYSTEM:  Alert & Oriented X3        Labs:                             Urinalysis Basic - ( 19 Feb 2024 09:07 )    Color: x / Appearance: x / SG: x / pH: x  Gluc: 103 mg/dL / Ketone: x  / Bili: x / Urobili: x   Blood: x / Protein: x / Nitrite: x   Leuk Esterase: x / RBC: x / WBC x   Sq Epi: x / Non Sq Epi: x / Bacteria: x

## 2024-02-21 NOTE — CONSULT NOTE ADULT - ASSESSMENT
49 yo f W/pmhx: asthma, acute right parietal infarct (Aug 2021), anxiety with depression, and who was recently in our ER due to left ankle fracture after a fall (Feb 16 - splint applied), presents to the ER today due to tenderness to area along with paresthesia to distal toes/ She also reports difficulty with ambulation at home. When splint was removed, blisters to the skin were noted. A new splint was then applied to left lower leg    SURGICAL CONSULT REQUESTED TO EVALUATE BLISTERS. Per pt, blisters were initially filled with a yellow fluid, now there appears to be blood in them. She is on ASA 81 mg daily    1. Bimalleolar fracture of left ankle.    --INCOMPLETE-- 49 yo f W/pmhx: asthma, acute right parietal infarct (Aug 2021), anxiety with depression, and who was recently in our ER due to left ankle fracture after a fall (Feb 16 - splint applied), presents to the ER today due to tenderness to area along with paresthesia to distal toes/ She also reports difficulty with ambulation at home. When splint was removed, blisters to the skin were noted. A new splint was then applied to left lower leg    SURGICAL CONSULT REQUESTED TO EVALUATE BLISTERS. Per pt, blisters were initially filled with a yellow fluid, now there appears to be blood in them. She is on ASA 81 mg daily    1. Bimalleolar fracture of left ankle.  2. LLE  47 yo f W/pmhx: asthma, acute right parietal infarct (Aug 2021), anxiety with depression, and who was recently in our ER due to left ankle fracture after a fall (Feb 16 - splint applied), presents to the ER today due to tenderness to area along with paresthesia to distal toes/ She also reports difficulty with ambulation at home. When splint was removed, blisters to the skin were noted. A new splint was then applied to left lower leg    SURGICAL CONSULT REQUESTED TO EVALUATE BLISTERS. Per pt, blisters were initially filled with a yellow fluid, now there appears to be blood in them. She is on ASA 81 mg daily    Pt seen with Dr. Suggs:    1. Bimalleolar fracture of left ankle.  2. LLE blisters    Per Dr. Suggs: today or tomorrow, will make tiny incision and drain each of the blisters, leaving skin intact, and obtain culture to r/o any oinfection. Continue telfa/nonadherent bandage thereafter

## 2024-02-21 NOTE — PROGRESS NOTE ADULT - ASSESSMENT
47yo female who has history of asthma, acute right parietal infarct (Aug 2021), anxiety with depression, and who was recently in our ER due to left ankle fracture after a fall (Feb 16 - splint applied), presents to the ER today due to tenderness to area along with paresthesia to distal toes/ She also reports difficulty with ambulation at home. When splint was removed, blisters to the skin were noted. A new splint was then applied to left lower leg    # Difficulty ambulating  # LLE bimalleolar ankle fx (sp splint exchange 2/18) dx'ed 2/1624   # LLE Hemorrhagic appearing blisters  - pt was in emergency room 2/16 - was discharged home from ED with op ortho fu, then returned to ED due to pain  - able to move toes, no numbness, no ternderness to knee   - Ortho eval appreciated - NWB w/ crutches LLE - no inpatient intervention - will need sx outpatient   - DVT PPX , Bowel regimen, Pain control   - PT / Physiatry - rehab   - Pending sx eval for blisters    # H/O CVA - asa, statin    # H/O Mood disorder; denies si/ hi ; cw home medications ; fluoxetine stopped > 3 months ago     # dvt/ gi ppx/diet  # dispo: dc ready   # handoff: D/c to [facility] when ready   49yo female who has history of asthma, acute right parietal infarct (Aug 2021), anxiety with depression, and who was recently in our ER due to left ankle fracture after a fall (Feb 16 - splint applied), presents to the ER today due to tenderness to area along with paresthesia to distal toes/ She also reports difficulty with ambulation at home. When splint was removed, blisters to the skin were noted. A new splint was then applied to left lower leg    # Difficulty ambulating  # LLE bimalleolar ankle fx s/p splinter and dx'ed 2/16/24. S/p splint exchange 2/18   # LLE Hemorrhagic appearing blisters  - pt was in emergency room 2/16 - was discharged home from ED with op ortho fu, then returned to ED due to pain  - Blisters with yellow-appearing fluid seen on splint exchange 2/18, now progressed to hemorrhagic appearing and painful   - Ortho eval appreciated - NWB w/ crutches LLE - no inpatient intervention - will need sx outpatient   - DVT PPX , Bowel regimen, Pain control   - PT / Physiatry - rehab. [place] how to use crutches   - Pending sx eval for blisters    # H/O CVA - asa, statin    # H/O Mood disorder; denies si/ hi ; cw home medications ; fluoxetine stopped > 3 months ago     # dvt/ gi ppx/diet  # dispo: D/c to [facility] when ready. Anticipated for tomorrow   # handoff: pending sx eval for blisters

## 2024-02-21 NOTE — CONSULT NOTE ADULT - SUBJECTIVE AND OBJECTIVE BOX
48 f with left monique ankle frx  slipped on black ice  denies pain elsewhere    LLE in splint  mild/moderate swelling echymosis but no blisters  nvid    x-ray: monique ankle frx    plan  nwb  needs surgical intervention  will d/w dr infante/darrius/miguel re timing inpatient vs outpatient  nwb with crutches  
HPI: 48 y o female German  speaking   who has history of asthma, acute right parietal infarct (Aug 2021), anxiety with depression, and who was recently in our ER due to left ankle fracture after a fall (Feb 16 - splint applied), presents to the ER today due to tenderness to area along with paresthesia to distal toes/ She also reports difficulty with ambulation at home. When splint was removed, blisters to the skin were noted. A new splint was then applied to left lower leg ,  ptn referred to me  for eval and  tx  ptn  labs  , imaging  and  medical  notes  are appreciated  and reviewed .    PTN  REFERRED TO ACUTE  REHAB  FOR  EVAL AND  TX   PAST MEDICAL & SURGICAL HISTORY:  Asthma      Anxiety and depression      Vapes nicotine containing substance      S/P hernia repair      H/O cervical spine surgery          Hospital Course:    TODAY'S SUBJECTIVE & REVIEW OF SYMPTOMS:     Constitutional WNL   Cardio WNL   Resp WNL   GI WNL  Heme WNL  Endo WNL  Skin WNL  MSK WNL  Neuro WNL  Cognitive WNL  Psych WNL      MEDICATIONS  (STANDING):  aspirin  chewable 81 milliGRAM(s) Oral daily  atorvastatin 80 milliGRAM(s) Oral at bedtime  heparin   Injectable 5000 Unit(s) SubCutaneous every 12 hours  morphine  - Injectable 1 milliGRAM(s) IV Push once    MEDICATIONS  (PRN):  acetaminophen     Tablet .. 650 milliGRAM(s) Oral every 6 hours PRN Mild Pain (1 - 3)  albuterol    90 MICROgram(s) HFA Inhaler 2 Puff(s) Inhalation every 6 hours PRN Shortness of Breath and/or Wheezing  clonazePAM  Tablet 0.5 milliGRAM(s) Oral three times a day PRN anxiety  oxycodone    5 mG/acetaminophen 325 mG 1 Tablet(s) Oral every 4 hours PRN Moderate Pain (4 - 6)      FAMILY HISTORY:  Family history of CVA (Mother)        Allergies    No Known Allergies    Intolerances        SOCIAL HISTORY:    [  ] Etoh  [  ] Smoking  [  ] Substance abuse     Home Environment:  [  ] Home Alone  [x ] Lives with Family  [  ] Home Health Aid    Dwelling:  [  ] Apartment  [x  ] Private House  [  ] Adult Home  [  ] Skilled Nursing Facility      [  ] Short Term  [  ] Long Term  [ x ] Stairs       Elevator [  ]    FUNCTIONAL STATUS PTA: (Check all that apply)  Ambulation: [ x  ]Independent    [  ] Dependent     [  ] Non-Ambulatory  Assistive Device: [  ] SA Cane  [  ]  Q Cane  [  ] Walker  [  ]  Wheelchair  ADL : [ x ] Independent  [  ]  Dependent       Vital Signs Last 24 Hrs  T(C): 36.8 (19 Feb 2024 04:57), Max: 36.8 (18 Feb 2024 20:04)  T(F): 98.2 (19 Feb 2024 04:57), Max: 98.3 (18 Feb 2024 20:04)  HR: 71 (19 Feb 2024 04:57) (70 - 77)  BP: 102/55 (19 Feb 2024 04:57) (102/55 - 106/64)  BP(mean): --  RR: 18 (19 Feb 2024 04:57) (18 - 18)  SpO2: 97% (18 Feb 2024 20:04) (97% - 97%)    Parameters below as of 18 Feb 2024 20:04  Patient On (Oxygen Delivery Method): room air          PHYSICAL EXAM: Alert & Oriented X3  GENERAL: NAD, well-groomed, well-developed  HEAD:  Atraumatic, Normocephalic  EYES: EOMI, PERRLA, conjunctiva and sclera clear  NECK: Supple, No JVD, Normal thyroid  CHEST/LUNG: Clear to percussion bilaterally; No rales, rhonchi, wheezing, or rubs  HEART: Regular rate and rhythm; No murmurs, rubs, or gallops  ABDOMEN: Soft, Nontender, Nondistended; Bowel sounds present  EXTREMITIES:  2+ Peripheral Pulses, No clubbing, cyanosis, or edema    NERVOUS SYSTEM:  Cranial Nerves 2-12 intact [ x ] Abnormal  [  ]  ROM: WFL all extremities [  ]  Abnormal [limited  lt  le    ]  Motor Strength: WFL all extremities  [x  ]  Abnormal [  ]  Sensation: intact to light touch [x  ] Abnormal [  ]  Reflexes: Symmetric [ x ]  Abnormal [  ]    FUNCTIONAL STATUS:  Bed Mobility: Independent [  ]  Supervision [  ]  Needs Assistance [x  ]  N/A [  ]  Transfers: Independent [  ]  Supervision [  ]  Needs Assistance [  ]  N/A [x  ]   Ambulation: Independent [  ]  Supervision [  ]  Needs Assistance [  ]  N/A [x  ]  ADL: Independent [  ] Requires Assistance [  ] N/A [ x ]  SEE PT/OT IE NOTES    LABS:                        12.8   8.31  )-----------( 301      ( 19 Feb 2024 09:07 )             37.6     02-19    140  |  103  |  11  ----------------------------<  103<H>  4.5   |  26  |  0.6<L>    Ca    9.2      19 Feb 2024 09:07    TPro  7.5  /  Alb  4.3  /  TBili  0.2  /  DBili  x   /  AST  16  /  ALT  17  /  AlkPhos  110  02-19    PT/INR - ( 19 Feb 2024 09:07 )   PT: 12.30 sec;   INR: 1.08 ratio         PTT - ( 19 Feb 2024 09:07 )  PTT:37.6 sec  Urinalysis Basic - ( 19 Feb 2024 09:07 )    Color: x / Appearance: x / SG: x / pH: x  Gluc: 103 mg/dL / Ketone: x  / Bili: x / Urobili: x   Blood: x / Protein: x / Nitrite: x   Leuk Esterase: x / RBC: x / WBC x   Sq Epi: x / Non Sq Epi: x / Bacteria: x        RADIOLOGY & ADDITIONAL STUDIES:< from: Xray Ankle Complete 3 Views, Left (02.16.24 @ 14:24) >  Findings/  impression:    Displaced fractures of the medial and lateral malleoli. Ankle mortise is   widened. Soft tissue swelling.    < end of copied text >      Assesment:
MALDONADO OJEDA [id number]  49y Female  2d    HPI:  (Used both ER records and for further details,  phone [phone] though patient speaks some English) 49yo female who has history of asthma, acute right parietal infarct (Aug 2021), anxiety with depression, and who was recently in our ER due to left ankle fracture after a fall (Feb 16 - splint applied), presents to the ER today due to tenderness to area along with paresthesia to distal toes/ She also reports difficulty with ambulation at home. When splint was removed, blisters to the skin were noted. A new splint was then applied to left lower leg  (18 Feb 2024 23:46)    SURGICAL CONSULT REQUESTED TO EVALUATE BLISTERS. Per pt, blisters were initially filled with a yellow fluid, now there appears to be blood in them. She is on ASA 81 mg daily    PAST MEDICAL & SURGICAL HISTORY:  Asthma  Anxiety and depression  Vapes nicotine containing substance  S/P hernia repair  H/O cervical spine surgery          MEDICATIONS  (STANDING):  aspirin  chewable 81 milliGRAM(s) Oral daily  atorvastatin 80 milliGRAM(s) Oral at bedtime  heparin   Injectable 5000 Unit(s) SubCutaneous every 12 hours  pantoprazole    Tablet 40 milliGRAM(s) Oral before breakfast    MEDICATIONS  (PRN):  acetaminophen     Tablet .. 650 milliGRAM(s) Oral every 6 hours PRN Mild Pain (1 - 3)  albuterol    90 MICROgram(s) HFA Inhaler 2 Puff(s) Inhalation every 6 hours PRN Shortness of Breath and/or Wheezing  clonazePAM  Tablet 0.5 milliGRAM(s) Oral three times a day PRN anxiety  morphine  - Injectable 2 milliGRAM(s) IV Push every 6 hours PRN Severe Pain (7 - 10)  oxycodone    5 mG/acetaminophen 325 mG 1 Tablet(s) Oral every 4 hours PRN Moderate Pain (4 - 6)      Allergies    No Known Allergies    Intolerances        REVIEW OF SYSTEMS    [ ] A ten-point review of systems was otherwise negative except as noted.  [ ] Due to altered mental status/intubation, subjective information were not able to be obtained from the patient. History was obtained, to the extent possible, from review of the chart and collateral sources of information.      Vital Signs Last 24 Hrs  T(C): 35.9 (21 Feb 2024 14:00), Max: 36.4 (21 Feb 2024 05:29)  T(F): 96.7 (21 Feb 2024 14:00), Max: 97.5 (21 Feb 2024 05:29)  HR: 106 (21 Feb 2024 14:00) (77 - 106)  BP: 133/60 (21 Feb 2024 14:00) (109/66 - 133/60)  BP(mean): --  RR: 18 (21 Feb 2024 14:00) (18 - 18)  SpO2: --        PHYSICAL EXAM:  GENERAL: NAD,   EXTREMITIES:  LLE in plaster splint (calf-->foot) +air/fluid/blood filled blisters x 4 to josafat-medial aspect of lower leg with mild surrounding tenderness to palpation; +foot swelling    Labs:               12.8   8.31  )-----------( 301      ( 02-19 @ 09:07 )             37.6                13.3   10.08 )-----------( 329      ( 02-19 @ 00:27 )             39.1         Xray Foot AP + Lateral + Oblique, Left (02.16.24 @ 14:24) >  Findings/  impression:    Displaced fractures of the medial and lateral malleoli. Ankle mortise is   widened. Soft tissue swelling.    Calcaneal spurring.

## 2024-02-21 NOTE — CONSULT NOTE ADULT - ATTENDING COMMENTS
above noted discussed case with surgical resident and PA abdomen soft no distension LT leg blisters appear to be infected will do drainage and do c/s

## 2024-02-21 NOTE — CHART NOTE - NSCHARTNOTEFT_GEN_A_CORE
LLE blisters (2 largest) cleaned with betadine and punctured with 11 blade w/drainage of yellow serous fluid from both; overlying skin left intact. Two small blisters left intact at this time  Culture x 1 obtained  Telfa and tegaderm applied; (may change PRN if drainage occurs)  Will re-evaluate tomorrow

## 2024-02-22 ENCOUNTER — TRANSCRIPTION ENCOUNTER (OUTPATIENT)
Age: 49
End: 2024-02-22

## 2024-02-22 VITALS
HEART RATE: 98 BPM | OXYGEN SATURATION: 97 % | DIASTOLIC BLOOD PRESSURE: 63 MMHG | TEMPERATURE: 99 F | RESPIRATION RATE: 18 BRPM | SYSTOLIC BLOOD PRESSURE: 109 MMHG

## 2024-02-22 PROCEDURE — 99239 HOSP IP/OBS DSCHRG MGMT >30: CPT

## 2024-02-22 RX ORDER — KETOROLAC TROMETHAMINE 30 MG/ML
30 SYRINGE (ML) INJECTION ONCE
Refills: 0 | Status: DISCONTINUED | OUTPATIENT
Start: 2024-02-22 | End: 2024-02-22

## 2024-02-22 RX ORDER — KETOROLAC TROMETHAMINE 30 MG/ML
1 SYRINGE (ML) INJECTION
Qty: 20 | Refills: 0
Start: 2024-02-22 | End: 2024-02-26

## 2024-02-22 RX ORDER — FLUOXETINE HCL 10 MG
0 CAPSULE ORAL
Qty: 0 | Refills: 0 | DISCHARGE

## 2024-02-22 RX ADMIN — Medication 650 MILLIGRAM(S): at 19:45

## 2024-02-22 RX ADMIN — HEPARIN SODIUM 5000 UNIT(S): 5000 INJECTION INTRAVENOUS; SUBCUTANEOUS at 05:09

## 2024-02-22 RX ADMIN — Medication 650 MILLIGRAM(S): at 20:35

## 2024-02-22 RX ADMIN — Medication 81 MILLIGRAM(S): at 11:23

## 2024-02-22 RX ADMIN — MORPHINE SULFATE 2 MILLIGRAM(S): 50 CAPSULE, EXTENDED RELEASE ORAL at 06:51

## 2024-02-22 RX ADMIN — MORPHINE SULFATE 2 MILLIGRAM(S): 50 CAPSULE, EXTENDED RELEASE ORAL at 14:10

## 2024-02-22 RX ADMIN — Medication 30 MILLIGRAM(S): at 18:19

## 2024-02-22 RX ADMIN — ATORVASTATIN CALCIUM 80 MILLIGRAM(S): 80 TABLET, FILM COATED ORAL at 20:38

## 2024-02-22 RX ADMIN — PANTOPRAZOLE SODIUM 40 MILLIGRAM(S): 20 TABLET, DELAYED RELEASE ORAL at 05:10

## 2024-02-22 RX ADMIN — HEPARIN SODIUM 5000 UNIT(S): 5000 INJECTION INTRAVENOUS; SUBCUTANEOUS at 17:13

## 2024-02-22 RX ADMIN — MORPHINE SULFATE 2 MILLIGRAM(S): 50 CAPSULE, EXTENDED RELEASE ORAL at 05:10

## 2024-02-22 NOTE — DISCHARGE NOTE NURSING/CASE MANAGEMENT/SOCIAL WORK - PATIENT PORTAL LINK FT
You can access the FollowMyHealth Patient Portal offered by [facility] by registering at the following website: [email or web address]. By joining [facility] FollowMyHealth portal, you will also be able to view your health information using other applications (apps) compatible with our system.

## 2024-02-22 NOTE — DISCHARGE NOTE NURSING/CASE MANAGEMENT/SOCIAL WORK - NSDCPEFALRISK_GEN_ALL_CORE
For information on Fall & Injury Prevention, visit: [email or web address] OR  [email or web address] OR  https://www.cdc.gov/steadi/patient.html

## 2024-02-22 NOTE — PROGRESS NOTE ADULT - SUBJECTIVE AND OBJECTIVE BOX
Pt with left ankle fracture   Surgery to be booked as an outpatient with   All of the patients questions were answered ( was utilized)   General surgery drained her fracture blisters yesterday   Explained the importance of elevating the extremity on 3+ pillows for swelling reduction   encouraged to be oob to chair   pain control  DVT prophylaxis   medicine to write pre op risk assessment  d/w attg

## 2024-02-23 LAB
CULTURE RESULTS: NO GROWTH — SIGNIFICANT CHANGE UP
SPECIMEN SOURCE: SIGNIFICANT CHANGE UP
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
## 2024-03-01 DIAGNOSIS — S90.522A BLISTER (NONTHERMAL), LEFT ANKLE, INITIAL ENCOUNTER: ICD-10-CM

## 2024-03-01 DIAGNOSIS — Z86.73 PERSONAL HISTORY OF TRANSIENT ISCHEMIC ATTACK (TIA), AND CEREBRAL INFARCTION WITHOUT RESIDUAL DEFICITS: ICD-10-CM

## 2024-03-01 DIAGNOSIS — Z79.82 LONG TERM (CURRENT) USE OF ASPIRIN: ICD-10-CM
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
## 2024-03-01 DIAGNOSIS — L89.522 PRESSURE ULCER OF LEFT ANKLE, STAGE 2: ICD-10-CM

## 2024-03-01 DIAGNOSIS — F32.A DEPRESSION, UNSPECIFIED: ICD-10-CM

## 2024-03-01 DIAGNOSIS — J45.909 UNSPECIFIED ASTHMA, UNCOMPLICATED: ICD-10-CM

## 2024-03-01 DIAGNOSIS — R20.2 PARESTHESIA OF SKIN: ICD-10-CM
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
## 2024-03-01 DIAGNOSIS — S82.842A DISPLACED BIMALLEOLAR FRACTURE OF LEFT LOWER LEG, INITIAL ENCOUNTER FOR CLOSED FRACTURE: ICD-10-CM

## 2024-03-01 DIAGNOSIS — F41.9 ANXIETY DISORDER, UNSPECIFIED: ICD-10-CM

## 2024-03-01 DIAGNOSIS — F17.290 NICOTINE DEPENDENCE, OTHER TOBACCO PRODUCT, UNCOMPLICATED: ICD-10-CM

## 2024-03-01 DIAGNOSIS — R26.2 DIFFICULTY IN WALKING, NOT ELSEWHERE CLASSIFIED: ICD-10-CM

## 2024-03-04 RX ORDER — OXYCODONE AND ACETAMINOPHEN 5; 325 MG/1; MG/1
1 TABLET ORAL
Refills: 0 | DISCHARGE
Start: 2024-03-04

## 2024-03-04 RX ORDER — OXYCODONE 5 MG/1
5 TABLET ORAL
Qty: 30 | Refills: 0 | Status: ACTIVE | COMMUNITY
Start: 2024-03-04 | End: 1900-01-01

## 2024-03-04 RX ORDER — ASPIRIN 81 MG/1
81 TABLET, COATED ORAL
Qty: 60 | Refills: 0 | Status: ACTIVE | COMMUNITY
Start: 2024-03-04 | End: 1900-01-01

## 2024-03-04 RX ORDER — ONDANSETRON 4 MG/1
4 TABLET, ORALLY DISINTEGRATING ORAL 3 TIMES DAILY
Qty: 21 | Refills: 0 | Status: ACTIVE | COMMUNITY
Start: 2024-03-04 | End: 1900-01-01

## 2024-03-04 RX ORDER — CEPHALEXIN 500 MG/1
500 CAPSULE ORAL 4 TIMES DAILY
Qty: 20 | Refills: 0 | Status: ACTIVE | COMMUNITY
Start: 2024-03-04 | End: 1900-01-01

## 2024-03-04 RX ORDER — ACETAMINOPHEN 500 MG/1
500 TABLET, COATED ORAL
Qty: 30 | Refills: 0 | Status: ACTIVE | COMMUNITY
Start: 2024-03-04 | End: 1900-01-01

## 2024-03-05 ENCOUNTER — APPOINTMENT (OUTPATIENT)
Dept: ORTHOPEDIC SURGERY | Facility: HOSPITAL | Age: 49
End: 2024-03-05

## 2024-03-05 ENCOUNTER — OUTPATIENT (OUTPATIENT)
Dept: OUTPATIENT SERVICES | Facility: HOSPITAL | Age: 49
LOS: 1 days | Discharge: HOME | End: 2024-03-05
Payer: MEDICAID

## 2024-03-05 ENCOUNTER — TRANSCRIPTION ENCOUNTER (OUTPATIENT)
Age: 49
End: 2024-03-05

## 2024-03-05 VITALS
DIASTOLIC BLOOD PRESSURE: 51 MMHG | HEIGHT: 62 IN | HEART RATE: 82 BPM | TEMPERATURE: 97 F | OXYGEN SATURATION: 98 % | SYSTOLIC BLOOD PRESSURE: 93 MMHG | WEIGHT: 207.23 LBS | RESPIRATION RATE: 17 BRPM

## 2024-03-05 VITALS — RESPIRATION RATE: 17 BRPM | HEART RATE: 88 BPM | DIASTOLIC BLOOD PRESSURE: 50 MMHG | SYSTOLIC BLOOD PRESSURE: 100 MMHG

## 2024-03-05 DIAGNOSIS — S82.842A DISPLACED BIMALLEOLAR FRACTURE OF LEFT LOWER LEG, INITIAL ENCOUNTER FOR CLOSED FRACTURE: ICD-10-CM

## 2024-03-05 DIAGNOSIS — Z98.890 OTHER SPECIFIED POSTPROCEDURAL STATES: Chronic | ICD-10-CM

## 2024-03-05 PROCEDURE — 73600 X-RAY EXAM OF ANKLE: CPT | Mod: LT

## 2024-03-05 PROCEDURE — 27814 TREATMENT OF ANKLE FRACTURE: CPT | Mod: LT

## 2024-03-05 PROCEDURE — C1769: CPT

## 2024-03-05 PROCEDURE — 27829 TREAT LOWER LEG JOINT: CPT | Mod: LT

## 2024-03-05 PROCEDURE — C1713: CPT

## 2024-03-05 PROCEDURE — C9399: CPT

## 2024-03-05 RX ORDER — SENNA PLUS 8.6 MG/1
1 TABLET ORAL
Refills: 0 | DISCHARGE

## 2024-03-05 RX ORDER — ONDANSETRON 8 MG/1
4 TABLET, FILM COATED ORAL ONCE
Refills: 0 | Status: DISCONTINUED | OUTPATIENT
Start: 2024-03-05 | End: 2024-03-05

## 2024-03-05 RX ORDER — ACETAMINOPHEN 500 MG
650 TABLET ORAL ONCE
Refills: 0 | Status: COMPLETED | OUTPATIENT
Start: 2024-03-05 | End: 2024-03-05

## 2024-03-05 RX ORDER — HYDROMORPHONE HYDROCHLORIDE 2 MG/ML
0.5 INJECTION INTRAMUSCULAR; INTRAVENOUS; SUBCUTANEOUS
Refills: 0 | Status: DISCONTINUED | OUTPATIENT
Start: 2024-03-05 | End: 2024-03-05

## 2024-03-05 RX ORDER — SODIUM CHLORIDE 9 MG/ML
1000 INJECTION, SOLUTION INTRAVENOUS
Refills: 0 | Status: DISCONTINUED | OUTPATIENT
Start: 2024-03-05 | End: 2024-03-05

## 2024-03-05 RX ORDER — MIDODRINE HYDROCHLORIDE 2.5 MG/1
2 TABLET ORAL
Refills: 0 | DISCHARGE

## 2024-03-05 RX ADMIN — Medication 650 MILLIGRAM(S): at 13:43

## 2024-03-05 RX ADMIN — SODIUM CHLORIDE 100 MILLILITER(S): 9 INJECTION, SOLUTION INTRAVENOUS at 12:30

## 2024-03-05 RX ADMIN — Medication 650 MILLIGRAM(S): at 13:20

## 2024-03-05 NOTE — ASU PATIENT PROFILE, ADULT - NSICDXPASTMEDICALHX_GEN_ALL_CORE_FT
PAST MEDICAL HISTORY:  Anxiety and depression     Asthma     H/O hypotension     HLD (hyperlipidemia)     Vapes nicotine containing substance

## 2024-03-05 NOTE — ASU PATIENT PROFILE, ADULT - FALL HARM RISK - HARM RISK INTERVENTIONS

## 2024-03-05 NOTE — ASU PATIENT PROFILE, ADULT - SURGICAL SITE INCISION
Acute Care - Wound/Debridement Treatment Note  [facility]     Patient Name: Saskia Groves  : 1971  MRN: [id number]  Today's Date: 3/8/2020                  Admit Date: 2020    Visit Dx:    ICD-10-CM ICD-9-CM   1. PVD (peripheral vascular disease) (CMS/HCC) I73.9 443.9   2. Pleural effusion J90 511.9   3. S/P femoral-femoral bypass surgery Z95.828 V45.89   4. Pharyngeal dysphagia R13.13 787.23       Patient Active Problem List   Diagnosis   • PVD (peripheral vascular disease) (CMS/[facility])   • Lumbar radiculopathy   • Paresthesia   • Causalgia of lower limb   • Hiatal hernia   • Current smoker   • Bilateral carotid artery stenosis   • COPD (chronic obstructive pulmonary disease) (CMS/[facility])   • Coronary artery disease   • Hypertension   • Hyperlipidemia   • Diabetes mellitus (CMS/[facility])   • Sepsis (CMS/[facility])   • Pleural effusion - bilateral mod/large pleural effusions on CT chest 2020.    • Acute pulmonary edema (CMS/[facility])   • Acute hypoxemic respiratory failure (CMS/[facility])           Wound 20 2230 Right groin Incision (Active)   Dressing Appearance dry;intact 3/8/2020  8:55 AM   Closure Adhesive bandage;GILLIAN 3/7/2020  8:00 PM   Base dressing in place, unable to visualize 3/8/2020  8:55 AM   Wound Output (mL) 0 3/8/2020  8:55 AM       Wound 20 2302 Left groin Incision (Active)   Dressing Appearance dry;intact 3/8/2020  8:55 AM   Closure Adhesive bandage;GILLIAN 3/7/2020  8:00 PM   Base dressing in place, unable to visualize 3/8/2020  8:55 AM   Wound Output (mL) 0 3/8/2020  8:55 AM       Wound 20 0715 Right lateral chest Puncture (Active)   Dressing Appearance dry;intact 3/7/2020  8:00 PM   Closure None 3/7/2020  8:00 PM       NPWT (Negative Pressure Wound Therapy) 20 1200 groin (Active)   Therapy Setting continuous therapy 3/8/2020  8:55 AM   Dressing foam, black 3/7/2020  8:00 PM   Pressure Setting 125 mmHg 3/8/2020  8:55 AM         WOUND DEBRIDEMENT                  Therapy  Treatment    Rehabilitation Treatment Summary     Row Name 03/08/20 0855             Treatment Time/Intention    Discipline  physical therapist  -      Document Type  wound care;therapy note (daily note)  -      Subjective Information  no complaints  -      Mode of Treatment  physical therapy;individual therapy  -      Care Plan Review  care plan/treatment goals reviewed;risks/benefits reviewed;current/potential barriers reviewed;patient/other agree to care plan  -MC      Recorded by [] Yen Cannon, PT 03/08/20 1110      Row Name 03/08/20 0855             Cognitive Assessment/Intervention- PT/OT    Orientation Status (Cognition)  oriented x 4  -MC      Recorded by [MC] Yen Cannon, PT 03/08/20 1110      Row Name 03/08/20 0855             Positioning and Restraints    Pre-Treatment Position  in bed  -      Post Treatment Position  bed  -MC      In Bed  supine;call light within reach;encouraged to call for assist  -MC      Recorded by [MC] Yen Cannon, PT 03/08/20 1110      Row Name 03/08/20 0855             Pain Scale: Numbers Pre/Post-Treatment    Pain Scale: Numbers, Pretreatment  0/10 - no pain  -      Pain Scale: Numbers, Post-Treatment  0/10 - no pain  -      Recorded by [] Yen Cannon, PT 03/08/20 1110      Row Name 03/08/20 0855             Wound 02/23/20 2230 Right groin Incision    Wound - Properties Group Date first assessed: 02/23/20 [EB] Time first assessed: 2230 [EB] Present on Hospital Admission: Y [EB] Side: Right [EB] Location: groin [EB] Primary Wound Type: Incision [EB] Recorded by:  [EB] Collette Hardy RN 02/23/20 2302    Dressing Appearance  dry;intact  -      Base  dressing in place, unable to visualize  -      Wound Output (mL)  0  -MC      Recorded by [] Yen Cannon, PT 03/08/20 1110      Row Name 03/08/20 0855             Wound 02/23/20 2302 Left groin Incision    Wound - Properties Group Date first assessed: 02/23/20 [EB] Time  first assessed: 2302 [EB] Present on Hospital Admission: Y [EB] Side: Left [EB] Location: groin [EB] Primary Wound Type: Incision [EB] Recorded by:  [EB] Collette Hardy RN 02/23/20 2303    Dressing Appearance  dry;intact  -MC      Base  dressing in place, unable to visualize  -      Wound Output (mL)  0  -MC      Recorded by [] Yen Cannon, PT 03/08/20 1110      Row Name                Wound 03/04/20 0715 Right lateral chest Puncture    Wound - Properties Group Date first assessed: 03/04/20 [JJ] Time first assessed: 0715 [JJ] Present on Hospital Admission: N [JJ] Side: Right [JJ] Orientation: lateral [JJ] Location: chest [JJ] Primary Wound Type: Puncture [JJ], s/p chest tube site  Recorded by:  [JJ] Edgar Kenny RN 03/04/20 0937    Row Name 03/08/20 0855             NPWT (Negative Pressure Wound Therapy) 02/28/20 1200 groin    NPWT (Negative Pressure Wound Therapy) - Properties Group Placement Date: 02/28/20 [MF] Placement Time: 1200 [MF] Location: groin [] Recorded by:  [] Arturo Madrid, PT 03/02/20 1129    Therapy Setting  continuous therapy  -      Pressure Setting  125 mmHg  -MC      Recorded by [] Yen Cannon, PT 03/08/20 1110      Row Name 03/08/20 0855             Coping    Observed Emotional State  accepting;cooperative;calm  -      Verbalized Emotional State  acceptance  -MC      Recorded by [] Yen Cannon, PT 03/08/20 1110      Row Name 03/08/20 0855             Plan of Care Review    Plan of Care Reviewed With  patient  -      Progress  no change  -      Outcome Summary  Bilateral groin prevena vac dressings intact with no apparent issues or complaints. PT wound care will continue to monitor.  -MC      Recorded by [LINUS] Yen Cannon, PT 03/08/20 1110        User Key  (r) = Recorded By, (t) = Taken By, (c) = Cosigned By    Initials Name Effective Dates Discipline     Arturo Madrid, PT 06/19/15 -  PT    Yen Nova, PT 04/03/18 -  PT     Edgar Bryan, RN 06/16/16 -  Nurse    Collette Berman RN 11/14/19 -  Nurse                PT Recommendation and Plan  Anticipated Equipment Needs at Discharge (PT): front wheeled walker  Anticipated Discharge Disposition (PT): skilled nursing facility  Planned Therapy Interventions (PT Eval): balance training, bed mobility training, gait training, home exercise program, transfer training, strengthening  Therapy Frequency (PT Clinical Impression): daily        Progress: no change      Progress: no change  Outcome Summary: Bilateral groin prevena vac dressings intact with no apparent issues or complaints. PT wound care will continue to monitor.  Plan of Care Reviewed With: patient            Time Calculation  PT Charges     Row Name 03/08/20 0855             Time Calculation    Start Time  0855  -      PT Goal Re-Cert Due Date  03/13/20  -        User Key  (r) = Recorded By, (t) = Taken By, (c) = Cosigned By    Initials Name Provider Type     Yen Cannon, PT Physical Therapist           Therapy Charges for Today     Code Description Service Date Service Provider Modifiers Qty    10855141978 HC PT NEG PRESS WOUND TO 50SQCM DME1 3/7/2020 Yen Cannon, PT  1    58776961316 HC PT NEG PRESS WOUND TO 50SQCM DME1 3/8/2020 Yen Cannon, PT  1            PT G-Codes  Outcome Measure Options: AM-PAC 6 Clicks Basic Mobility (PT)  AM-PAC 6 Clicks Score (PT): 19        Yen Cannon, ART  3/8/2020         no

## 2024-03-05 NOTE — BRIEF OPERATIVE NOTE - NSICDXBRIEFPROCEDURE_GEN_ALL_CORE_FT
PROCEDURES:  Open reduction and internal fixation of bimalleolar fracture of left ankle 05-Mar-2024 11:05:18  Murray Peters  ORIF, ankle, syndesmosis 05-Mar-2024 11:05:22  Murray Peters  XR stress view 05-Mar-2024 11:07:33  Murray Peters

## 2024-03-05 NOTE — ASU PATIENT PROFILE, ADULT - NSICDXPASTSURGICALHX_GEN_ALL_CORE_FT
PAST SURGICAL HISTORY:  H/O cervical spine surgery implant    S/P hernia repair R inguneal w/mesh

## 2024-03-05 NOTE — CHART NOTE - NSCHARTNOTEFT_GEN_A_CORE
PACU ANESTHESIA ADMISSION NOTE      Procedure: Open reduction and internal fixation of bimalleolar fracture of left ankle    ORIF, ankle, syndesmosis    XR stress view      Post op diagnosis:  Bimalleolar fracture of left ankle        __x__  Patent Airway    __x__  Full return of protective reflexes    ____  Full recovery from anesthesia / back to baseline status    Vitals:  T(C): 36.3 (03-05-24 @ 08:59), Max: 36.3 (03-05-24 @ 08:29)  HR: 82 (03-05-24 @ 08:59) (82 - 82)  BP: 93/51 (03-05-24 @ 08:59) (93/51 - 93/51)  RR: 17 (03-05-24 @ 08:59) (17 - 17)  SpO2: 98% (03-05-24 @ 08:59) (98% - 98%)    Mental Status:  __x__ Awake   ___x__ Alert   __x___ Drowsy   _____ Sedated    Nausea/Vomiting:  __x__ NO  ______Yes,   See Post - Op Orders          Pain Scale (0-10):  _____    Treatment: ____ None    __x__ See Post - Op/PCA Orders    Post - Operative Fluids:   ____ Oral   __x__ See Post - Op Orders    Plan: Discharge:   ____Home       _____Floor     _____Critical Care    _____  Other:___pending dispo, possible return to nursing home____    Comments: Patient had smooth intraoperative event, no anesthesia complication.  PACU Vital signs: per anesthesia record

## 2024-03-07 DIAGNOSIS — J45.909 UNSPECIFIED ASTHMA, UNCOMPLICATED: ICD-10-CM

## 2024-03-07 DIAGNOSIS — S82.842A DISPLACED BIMALLEOLAR FRACTURE OF LEFT LOWER LEG, INITIAL ENCOUNTER FOR CLOSED FRACTURE: ICD-10-CM
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
## 2024-03-07 DIAGNOSIS — Z86.73 PERSONAL HISTORY OF TRANSIENT ISCHEMIC ATTACK (TIA), AND CEREBRAL INFARCTION WITHOUT RESIDUAL DEFICITS: ICD-10-CM

## 2024-03-07 DIAGNOSIS — E78.5 HYPERLIPIDEMIA, UNSPECIFIED: ICD-10-CM
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
## 2024-03-14 ENCOUNTER — EMERGENCY (EMERGENCY)
Facility: HOSPITAL | Age: 49
LOS: 0 days | Discharge: HOME | End: 2024-03-15
Attending: EMERGENCY MEDICINE
Payer: MEDICAID

## 2024-03-14 VITALS
WEIGHT: 199.96 LBS | OXYGEN SATURATION: 97 % | HEART RATE: 80 BPM | HEIGHT: 62 IN | TEMPERATURE: 98 F | SYSTOLIC BLOOD PRESSURE: 103 MMHG | DIASTOLIC BLOOD PRESSURE: 65 MMHG | RESPIRATION RATE: 17 BRPM

## 2024-03-14 DIAGNOSIS — M25.572 PAIN IN LEFT ANKLE AND JOINTS OF LEFT FOOT: ICD-10-CM

## 2024-03-14 DIAGNOSIS — S82.892D OTHER FRACTURE OF LEFT LOWER LEG, SUBSEQUENT ENCOUNTER FOR CLOSED FRACTURE WITH ROUTINE HEALING: ICD-10-CM

## 2024-03-14 DIAGNOSIS — R10.9 UNSPECIFIED ABDOMINAL PAIN: ICD-10-CM

## 2024-03-14 DIAGNOSIS — F41.9 ANXIETY DISORDER, UNSPECIFIED: ICD-10-CM

## 2024-03-14 DIAGNOSIS — Z98.890 OTHER SPECIFIED POSTPROCEDURAL STATES: Chronic | ICD-10-CM
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
## 2024-03-14 DIAGNOSIS — J45.909 UNSPECIFIED ASTHMA, UNCOMPLICATED: ICD-10-CM

## 2024-03-14 DIAGNOSIS — R07.81 PLEURODYNIA: ICD-10-CM
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
## 2024-03-14 PROCEDURE — 73610 X-RAY EXAM OF ANKLE: CPT | Mod: LT

## 2024-03-14 PROCEDURE — 72170 X-RAY EXAM OF PELVIS: CPT | Mod: 26

## 2024-03-14 PROCEDURE — 99284 EMERGENCY DEPT VISIT MOD MDM: CPT | Mod: 25

## 2024-03-14 PROCEDURE — 73590 X-RAY EXAM OF LOWER LEG: CPT | Mod: 26,LT

## 2024-03-14 PROCEDURE — 73590 X-RAY EXAM OF LOWER LEG: CPT | Mod: LT

## 2024-03-14 PROCEDURE — 71250 CT THORAX DX C-: CPT | Mod: MC

## 2024-03-14 PROCEDURE — 99285 EMERGENCY DEPT VISIT HI MDM: CPT

## 2024-03-14 PROCEDURE — 71250 CT THORAX DX C-: CPT | Mod: 26,MC

## 2024-03-14 PROCEDURE — 73610 X-RAY EXAM OF ANKLE: CPT | Mod: 26,LT

## 2024-03-14 PROCEDURE — 72170 X-RAY EXAM OF PELVIS: CPT

## 2024-03-14 NOTE — ED ADULT TRIAGE NOTE - CHIEF COMPLAINT QUOTE
Pt was sent from [facility] for c/o fall on the buttock, denies hitting her head, not on anticoagulants, c/o left leg pain where she already has ankle fx, low back and right elbow pain. Denies LOC.

## 2024-03-15 PROBLEM — Z86.79 PERSONAL HISTORY OF OTHER DISEASES OF THE CIRCULATORY SYSTEM: Chronic | Status: ACTIVE | Noted: 2024-03-05

## 2024-03-15 PROBLEM — E78.5 HYPERLIPIDEMIA, UNSPECIFIED: Chronic | Status: ACTIVE | Noted: 2024-03-05

## 2024-03-15 NOTE — ED PROVIDER NOTE - PHYSICAL EXAMINATION
CONST: Well appearing in NAD  Head: NCAT  EYES: PERRL, EOMI, Sclera and conjunctiva clear.   ENT: Oropharynx normal appearing, no erythema or exudates. Uvula midline.  CARD: Normal S1 S2; Normal rate and rhythm  RESP: Equal BS B/L, No wheezes, rhonchi or rales. No distress  GI: Soft, non-tender, non-distended.  MS: R sided lower rib pain. No midline spinal tenderness.   SKIN: Warm, dry, no acute rashes.   NEURO: A&Ox3, No focal deficits. Strength 5/5 with no sensory deficits.

## 2024-03-15 NOTE — ED PROVIDER NOTE - NSFOLLOWUPINSTRUCTIONS_ED_ALL_ED_FT
Understanding Your Risk for Falls  Millions of people have serious injuries from falls each year. It is important to understand your risk of falling. Talk with your health care provider about your risk and what you can do to lower it.    If you do have a serious fall, make sure to tell your provider. Falling once raises your risk of falling again.    How can falls affect me?  Serious injuries from falls are common. These include:  Broken bones, such as hip fractures.  Head injuries, such as traumatic brain injuries (TBI) or concussions.  A fear of falling can cause you to avoid activities and stay at home. This can make your muscles weaker and raise your risk for a fall.    What can increase my risk?  There are a number of risk factors that increase your risk for falling. The more risk factors you have, the higher your risk of falling. Serious injuries from a fall happen most often to people who are older than 65 years old. Teenagers and young adults ages 15–29 are also at higher risk.    Common risk factors include:  Weakness in the lower body.  Being generally weak or confused due to long-term (chronic) illness.  Dizziness or balance problems.  Poor vision.  Medicines that cause dizziness or drowsiness. These may include:  Medicines for your blood pressure, heart, anxiety, insomnia, or swelling (edema).  Pain medicines.  Muscle relaxants.  Other risk factors include:  Drinking alcohol.  Having had a fall in the past.  Having foot pain or wearing improper footwear.  Working at a dangerous job.  Having any of the following in your home:  Tripping hazards, such as floor clutter or loose rugs.  Poor lighting.  Pets.  Having dementia or memory loss.  What actions can I take to lower my risk of falling?  Shower and bathtub, showing safety grab bars on the walls.  A grab bar next to a toilet.  Physical activity    Stay physically fit. Do strength and balance exercises. Consider taking a regular class to build strength and balance. Yoga and ricky chi are good options.    Vision    Have your eyes checked every year and your prescription for glasses or contacts updated as needed.    Shoes and walking aids    Wear non-skid shoes.  Wear shoes that have rubber soles and low heels.  Do not wear high heels.  Do not walk around the house in socks or slippers.  Use a cane or walker as told by your provider.  Home safety    Attach secure railings on both sides of your stairs.  Install grab bars for your bathtub, shower, and toilet. Use a non-skid mat in your bathtub or shower. Attach bath mats securely with double-sided, non-slip rug tape.  Use good lighting in all rooms. Keep a flashlight near your bed.  Make sure there is a clear path from your bed to the bathroom. Use night-lights.  Do not use throw rugs. Make sure all carpeting is taped or tacked down securely.  Remove all clutter from walkways and stairways, including extension cords.  Repair uneven or broken steps and floors.  Avoid walking on icy or slippery surfaces. Walk on the grass instead of on icy or slick sidewalks. Use ice melter to get rid of ice on walkways in the winter.  Use a cordless phone.  Questions to ask your health care provider  Can you help me check my risk for a fall?  Do any of my medicines make me more likely to fall?  Should I take a vitamin D supplement?  What exercises can I do to improve my strength and balance?  Should I make an appointment to have my vision checked?  Do I need a bone density test to check for weak bones (osteoporosis)?  Would it help to use a cane or a walker?  Where to find more information  Centers for Disease Control and Prevention, [place]: cdc.gov  Community-Based Fall Prevention Programs: cdc.gov  National [place] [employer] Aging: jodie.nih.gov  Contact a health care provider if:  You fall at home.  You are afraid of falling at home.  You feel weak, drowsy, or dizzy.  This information is not intended to replace advice given to you by your health care provider. Make sure you discuss any questions you have with your health care provider.

## 2024-03-15 NOTE — ED PROVIDER NOTE - PATIENT PORTAL LINK FT
You can access the FollowMyHealth Patient Portal offered by [facility] by registering at the following website: [email or web address]. By joining [email or web address]’s FollowMyHealth portal, you will also be able to view your health information using other applications (apps) compatible with our system.

## 2024-03-15 NOTE — ED PROVIDER NOTE - CLINICAL SUMMARY MEDICAL DECISION MAKING FREE TEXT BOX
49-year-old female, past medical history of asthma, anxiety, left ankle fracture status post ORIF with  on 3/5/2024, presents to the ED for evaluation s/p fall.  Patient with mechanical non-syncopal fall from standing at rehab facility today.  Patient was unable to get assistance going to the bathroom tried to go on her own upon standing from the toilet patient lost her balance and put wt on left foot and fell. No head trauma or LOC. No AC use. Now c/o right-sided rib pain and left ankle pain.  No abdominal pain, nausea, vomiting, headache, back pain, neck pain, chest pain, shortness of breath. On exam, pt in NAD, AAOx3, head NC/AT, CN II-XII intact, PEERL, EOMi, neck (-) midline tenderness, lungs CTA B/L, CV S1S2 regular, abdomen soft/NT/ND/(+)BS, back (-) midline tenderness, (+) soreness over right lower posterior ribs, (-) crepitus/deformity/step-off, LLE in splint. Imaging reviewed. Ortho evaluated pt and surgical wound. Cleared for discharge. Will d/c back to rehab with outpt follow up. Return precautions provided.

## 2024-03-15 NOTE — ED ADULT NURSE NOTE - NSFALLHARMRISKINTERV_ED_ALL_ED

## 2024-03-15 NOTE — ED ADULT NURSE NOTE - CHIEF COMPLAINT QUOTE
Pt was sent from [facility] for c/o fall on the buttock, denies hitting her head, not on anticoagulants, c/o left leg pain where she already has ankle fx, low back and right elbow pain. Denies LOC.

## 2024-03-15 NOTE — ED PROVIDER NOTE - OBJECTIVE STATEMENT
49-year-old female past medical history of asthma, anxiety, left ankle fracture status post ORIF with  On 3/5/2024 presents to the ED for evaluation status post fall.  Patient with mechanical nonsyncopal fall from standing at rehab facility today.  Patient was unable to get assistance going to the bathroom tried to go on her own upon standing from the toilet patient lost her balance try to balance her weight by standing on her broken ankle and fell over onto her right side.  No head strike, LOC or AC use.  Patient complaining of right-sided flank pain and left ankle pain.  No abdominal pain, nausea, vomiting, headache, back pain, neck pain, chest pain, shortness of breath.

## 2024-03-15 NOTE — ED PROVIDER NOTE - CARE PROVIDER_API CALL
Murray Peters  Orthopaedic Surgery  [address] [place] [address]  Phone: [phone]  Fax: [phone]  Established Patient  Follow Up Time:

## 2024-03-15 NOTE — CONSULT NOTE ADULT - SUBJECTIVE AND OBJECTIVE BOX
ORTHOPAEDIC SURGERY CONSULT NOTE    Reason for Consult: surgical follow up s/p L ankle ORIF    HPI: 49yFemale who presents for L ankle pain after mechanical fall today. She is s/p L ankle ORIF 3/5/24 with Dr. Peters. Today, patient reports she was trying to get up from seated position at rehab center when she lost her balance, put weight on her L ankle and went to ground. No HT or LOC. She reported being concerned about putting weight on L ankle, and sought ED as precaution. She reports mildly increased pain afterwards, but overall her pain has been improving since surgery. Otherwise, has been compliant with NWB LLE precautions, splint care. No other trauma. No pain elsewhere LLE, RLE, BUE.     PAST MEDICAL & SURGICAL HISTORY:  Asthma  Anxiety and depression  Vapes nicotine containing substance  H/O hypotension  HLD (hyperlipidemia)  S/P hernia repair  R inguneal w/mesh  H/O cervical spine surgery  implant    Allergies: No Known Allergies    Medications:     PHYSICAL EXAM:  Vital Signs Last 24 Hrs  T(C): 36.8 (14 Mar 2024 21:23), Max: 36.8 (14 Mar 2024 21:23)  T(F): 98.2 (14 Mar 2024 21:23), Max: 98.2 (14 Mar 2024 21:23)  HR: 80 (14 Mar 2024 21:23) (80 - 80)  BP: 103/65 (14 Mar 2024 21:23) (103/65 - 103/65)  BP(mean): --  RR: 17 (14 Mar 2024 21:23) (17 - 17)  SpO2: 97% (14 Mar 2024 21:23) (97% - 97%)    Parameters below as of 14 Mar 2024 21:23  Patient On (Oxygen Delivery Method): room air    Physical exam:  Awake, alert  Non-labored breathing    LLE  splint taken down  Minimal swelling, consistent with post op course  Nylon sutures in place medially and laterally. Incision C/D/I, healing well.   Compartments soft and compressible, no pain w/ passive stretch of digits  SILT SP/DP/T/Sural/Saph  Firing TA/EHL/FHL/GS  DP pulse 2+, cap refill <2    Imaging:   -serial radiographs of L ankle as compared to intraop fluoro demonstrate: s/p ORIF of L ankle with medial mal screws, distal fibula plate & lag screw, syndesmotic fixation. Alignment maintained. No signs of hardware failure.     A/P: 49y Female POD9 from L ankle ORIF, here for evaluation after low energy fall. Radiographs, exam, skin examined, and reassurance provided.    Procedure:   - incisions were dressed with clean dry dressing and splint was reapplied    Plan:  - NWB LLE  - keep splint clean and dry  - ice, elevate   - pain control   - ASA 81 BID for DVT prophylaxis, continue in outpatient setting  - follow up with Dr. Peters as previously scheduled this coming week

## 2024-03-15 NOTE — ED PROVIDER NOTE - CARE PLAN
Principal Discharge DX:	Fall   1 Principal Discharge DX:	Fall  Secondary Diagnosis:	Rib pain  Secondary Diagnosis:	Left ankle pain

## 2024-03-20 ENCOUNTER — APPOINTMENT (OUTPATIENT)
Dept: ORTHOPEDIC SURGERY | Facility: CLINIC | Age: 49
End: 2024-03-20
Payer: MEDICAID

## 2024-03-20 PROCEDURE — 99024 POSTOP FOLLOW-UP VISIT: CPT

## 2024-03-20 NOTE — DATA REVIEWED
[FreeTextEntry1] : I reviewed the x-rays of the patient's left ankle taken at the hospital this past weekend.  No evidence of refracture.  The hardware is intact and ankle mortise is congruent.

## 2024-03-20 NOTE — DISCUSSION/SUMMARY
[de-identified] : At this point the patient can start weightbearing with the boot on.  She should limit her weightbearing for the first 4 weeks.  She will start physical therapy.  I will see her back in approximately 3 to 4 weeks prior to her leaving [place].

## 2024-03-20 NOTE — HISTORY OF PRESENT ILLNESS
From: Jacob R Schwabe  Sent: 2/2/2022 10:33 AM CST  To: [facility] Nurse Msg Pool  Subject: Re: how Ilm feeling on Vivance    I am willing to try whatever you recommend, I do not know enough to make an informed decision  
[de-identified] : Patient is here for her 2-week follow-up.  She is status post left ankle ORIF by myself approximately 2 weeks ago.  She is doing well.  She unfortunately did have a fall resulting in pressure on the foot however she was seen at the hospital examined by the residents and found to have no evidence of complication.

## 2024-03-20 NOTE — PHYSICAL EXAM
[de-identified] : Her incisions are well-healed.  No evidence of infection.  She has full range of motion of the ankle.  Calf soft nontender.  Neurovascular intact

## 2024-04-12 ENCOUNTER — RESULT CHARGE (OUTPATIENT)
Age: 49
End: 2024-04-12

## 2024-04-12 ENCOUNTER — APPOINTMENT (OUTPATIENT)
Dept: ORTHOPEDIC SURGERY | Facility: CLINIC | Age: 49
End: 2024-04-12
Payer: MEDICAID

## 2024-04-12 PROCEDURE — 73610 X-RAY EXAM OF ANKLE: CPT | Mod: LT

## 2024-04-12 PROCEDURE — 99024 POSTOP FOLLOW-UP VISIT: CPT

## 2024-04-15 NOTE — HISTORY OF PRESENT ILLNESS
[de-identified] : Patient here for postoperative exam.  She is status post left ankle and syndesmosis ORIF.  She is doing better.  She has been utilizing a boot and walking.  She has moderate pain when walking.  Denies any interval trauma.

## 2024-04-15 NOTE — PHYSICAL EXAM
[de-identified] : Her incisions are well-healed.  There is no evidence of infection.  She has grossly intact range of motion.  Compartments are soft compressible.  She is neurovascular intact distally.

## 2024-04-15 NOTE — DATA REVIEWED
[FreeTextEntry1] : 3 views of the patient's left ankle ordered and reviewed by me personally.  X-rays demonstrate evidence of a healing bimalleolar ankle fracture status post ORIF.  The ankle joint is congruent.  Fractures appear to be healing.

## 2024-04-15 NOTE — DISCUSSION/SUMMARY
[de-identified] : Patient to gradually discontinue the boot and begin physical therapy.  I would like to see her back in 6 weeks for repeat exam.  All questions answered.

## 2024-05-22 ENCOUNTER — APPOINTMENT (OUTPATIENT)
Dept: ORTHOPEDIC SURGERY | Facility: CLINIC | Age: 49
End: 2024-05-22
Payer: SELF-PAY

## 2024-05-22 ENCOUNTER — RESULT CHARGE (OUTPATIENT)
Age: 49
End: 2024-05-22

## 2024-05-22 DIAGNOSIS — S82.842A DISPLACED BIMALLEOLAR FRACTURE OF LEFT LOWER LEG, INITIAL ENCOUNTER FOR CLOSED FRACTURE: ICD-10-CM

## 2024-05-22 PROCEDURE — 99024 POSTOP FOLLOW-UP VISIT: CPT

## 2024-05-22 PROCEDURE — 73610 X-RAY EXAM OF ANKLE: CPT | Mod: LT

## 2024-05-22 RX ORDER — GABAPENTIN 100 MG/1
100 CAPSULE ORAL 3 TIMES DAILY
Qty: 90 | Refills: 0 | Status: ACTIVE | COMMUNITY
Start: 2024-05-22 | End: 1900-01-01

## 2024-05-22 NOTE — PHYSICAL EXAM
[de-identified] : Her incisions are well-healed.  There is no evidence of infection.  She has grossly intact range of motion.  Compartments are soft compressible.  She is neurovascular intact distally. Positive tinels sign over SPN.

## 2024-05-22 NOTE — HISTORY OF PRESENT ILLNESS
[de-identified] : Patient here for postoperative exam.  She is status post left ankle and syndesmosis ORIF.  She is doing better.  She has been utilizing a boot and walking.  She has moderate pain when walking.  Denies any interval trauma. Has some burning sensation along the thigh and dorsum of foot.

## 2024-05-22 NOTE — DISCUSSION/SUMMARY
[de-identified] : Patient to gradually discontinue the boot and begin physical therapy.  I will prescribe her gabapentin for her discomfort.  I would like to see her back in 6 weeks for repeat exam.  All questions answered.

## 2024-06-08 NOTE — ED PROVIDER NOTE - NSSTROKETPAINCL_ONSET
Likely due to alcohol withdrawal  Will get repeat labs and monitor t bili---normal   If continues will consider further imaging     6/8----improved. No n/v     clear defined time onset within 4.5 hours of treatment

## 2024-06-26 ENCOUNTER — APPOINTMENT (OUTPATIENT)
Dept: ORTHOPEDIC SURGERY | Facility: CLINIC | Age: 49
End: 2024-06-26

## 2024-07-24 ENCOUNTER — APPOINTMENT (OUTPATIENT)
Dept: ORTHOPEDIC SURGERY | Facility: CLINIC | Age: 49
End: 2024-07-24

## 2025-05-06 NOTE — PATIENT PROFILE ADULT - NSTRANSFERBELONGINGSDISPO_GEN_A_NUR
Pharmacist stated that medicare Part B would like Patient to have an updated pneumonia vaccine (Prevnar 20) but did not require it for Patient to receive nebulizer solution. I gave pharmacy list of pneumonia vaccines Patient has received in the past to update their records. Pharmacy stated that nebulizer solution is still covered by medicare without her getting a vaccine, but that Patient may benefit from getting Prevnar 20.   with patient